# Patient Record
Sex: MALE | Employment: OTHER | URBAN - METROPOLITAN AREA
[De-identification: names, ages, dates, MRNs, and addresses within clinical notes are randomized per-mention and may not be internally consistent; named-entity substitution may affect disease eponyms.]

---

## 2019-08-06 ENCOUNTER — OFFICE VISIT (OUTPATIENT)
Dept: MEDICAL GROUP | Facility: IMAGING CENTER | Age: 59
End: 2019-08-06
Payer: OTHER MISCELLANEOUS

## 2019-08-06 VITALS
HEART RATE: 70 BPM | WEIGHT: 217 LBS | TEMPERATURE: 97.7 F | HEIGHT: 71 IN | RESPIRATION RATE: 12 BRPM | SYSTOLIC BLOOD PRESSURE: 114 MMHG | OXYGEN SATURATION: 94 % | BODY MASS INDEX: 30.38 KG/M2 | DIASTOLIC BLOOD PRESSURE: 78 MMHG

## 2019-08-06 DIAGNOSIS — Z12.12 SCREENING FOR COLORECTAL CANCER: ICD-10-CM

## 2019-08-06 DIAGNOSIS — E66.9 OBESITY (BMI 30-39.9): ICD-10-CM

## 2019-08-06 DIAGNOSIS — Z12.83 SCREENING EXAM FOR SKIN CANCER: ICD-10-CM

## 2019-08-06 DIAGNOSIS — M1A.0710 IDIOPATHIC CHRONIC GOUT OF RIGHT FOOT WITHOUT TOPHUS: ICD-10-CM

## 2019-08-06 DIAGNOSIS — Z12.11 SCREENING FOR COLORECTAL CANCER: ICD-10-CM

## 2019-08-06 PROCEDURE — 99205 OFFICE O/P NEW HI 60 MIN: CPT | Performed by: FAMILY MEDICINE

## 2019-08-06 RX ORDER — FEBUXOSTAT 40 MG/1
40 TABLET, FILM COATED ORAL DAILY
Qty: 21 TAB | Refills: 0 | Status: SHIPPED | OUTPATIENT
Start: 2019-08-06 | End: 2019-10-18 | Stop reason: SDUPTHER

## 2019-08-06 RX ORDER — AMLODIPINE AND VALSARTAN 5; 160 MG/1; MG/1
1 TABLET ORAL DAILY
COMMUNITY
End: 2022-10-21 | Stop reason: SDUPTHER

## 2019-08-06 RX ORDER — ATORVASTATIN CALCIUM 10 MG/1
10 TABLET, FILM COATED ORAL
Refills: 5 | COMMUNITY
Start: 2019-07-18 | End: 2022-02-07

## 2019-08-06 RX ORDER — LOSARTAN POTASSIUM AND HYDROCHLOROTHIAZIDE 12.5; 5 MG/1; MG/1
1 TABLET ORAL
Refills: 5 | COMMUNITY
Start: 2019-07-18 | End: 2019-08-06 | Stop reason: CLARIF

## 2019-08-06 RX ORDER — MULTIVIT WITH MINERALS/LUTEIN
TABLET ORAL
COMMUNITY

## 2019-08-06 ASSESSMENT — PATIENT HEALTH QUESTIONNAIRE - PHQ9: CLINICAL INTERPRETATION OF PHQ2 SCORE: 0

## 2019-08-06 ASSESSMENT — PAIN SCALES - GENERAL: PAINLEVEL: NO PAIN

## 2019-08-06 NOTE — ASSESSMENT & PLAN NOTE
Patient had recently had cardiology visit with Dr. Bloch and has been encouraged to initiate weight loss program.  Discussed with patient of the current dietary habit, patient does have a good basis of vegetarian diet, discussed with patient of several low hanging fruit way to change his lifestyle and improve his BMI while not impacting his goal in lessen gouty flare.

## 2019-08-06 NOTE — ASSESSMENT & PLAN NOTE
He reports the Tristanian decedent history and admits no skin check regularly since his move from [place] to the area.  There has been no significant family hx of skin cancer.  No pleuritic or color change in lesions is currently bothering him.  He does have 2 family members have been earlier colon cancer history and he has been monitoring with a colonoscopy every 5 years.

## 2019-08-06 NOTE — ASSESSMENT & PLAN NOTE
Patient reports the chronic issues with dietary controlled for the most part for the last 5 years.  He had 2 flares that is associated with dietary changes to the more meat and fish as well as alcohol on trip to [place].  Patient's significant other is an immigrant from [facility], they have been attempting mostly vegetarian diet at home.  And reports that has significantly less in the degree of gouty flare.  Over the last 2 months however patient had a significant episode of gouty flare occurring at the right first DIP.  Patient reported have been consistently taking TCM herbs four marvals as well as tart cherry with curcumin which seems to reduce it down but the severity is still at 6/10.  He had trial of colchicine and had skin peelings that he would like not to use it again especially he has been started on a statin.  Currently he has been improved but still would rate it at 1/10 in joint discomfort and swelling.

## 2019-08-06 NOTE — PROGRESS NOTES
Chief Complaint   Patient presents with   • Gout   • Hypertension   • Hyperlipidemia   • Concussion     oct 2018     Gregor Bonilla is a 58 y.o. male who usually sees  presents today to establish care at [facility] and to discuss ***.    HPI:  No problem-specific Assessment & Plan notes found for this encounter.    Depression Screening    Little interest or pleasure in doing things?  0 - not at all  Feeling down, depressed , or hopeless? 0 - not at all  Patient Health Questionnaire Score: 0    If depressive symptoms identified deferred to follow up visit unless specifically addressed in assesment and plan.      Interpretation of PHQ-9 Total Score   Score Severity   1-4 Minimal Depression   5-9 Mild Depression   10-14 Moderate Depression   15-19 Moderately Severe Depression   20-27 Severe Depression       Current medicines (including changes today)  Current Outpatient Medications   Medication Sig Dispense Refill   • atorvastatin (LIPITOR) 10 MG Tab Take 10 mg by mouth every day.  5   • amlodipine-valsartan (EXFORGE) 5-160 MG per tablet Take 1 Tab by mouth every day.     • GLUTAMINE PO Take  by mouth.     • Coenzyme Q10 (COQ10 PO) Take  by mouth.     • Ascorbic Acid (VITAMIN C) 1000 MG Tab Take  by mouth.     • MISC NATURAL PRODUCTS PO Take  by mouth.     • Omega-3 Fatty Acids (FISH OIL PO) Take  by mouth.     • Misc Natural Products (TART CHERRY ADVANCED PO) Take  by mouth.     • Turmeric (CURCUMIN 95 PO) Take  by mouth.     • CITRUS BERGAMOT PO Take  by mouth.     • MISC NATURAL PRODUCTS PO Take  by mouth.     • febuxostat (ULORIC) 40 MG Tab Take 1 Tab by mouth every day. 21 Tab 0     No current facility-administered medications for this visit.      He  has a past medical history of Hyperlipidemia and Hypertension.  He  has no past surgical history on file.  Social History     Tobacco Use   • Smoking status: Never Smoker   • Smokeless tobacco: Never Used   Substance Use Topics   • Alcohol use: Not Currently   • Drug use: Never      History reviewed. No pertinent family history.  No family status information on file.     Social History     Social History Narrative   • Not on file     ***  ROS  Constitutional: Negative for fever, chills, weight loss and malaise/fatigue.   HENT: Negative for ear pain, nosebleeds, congestion, sore throat and neck pain.    Eyes: Negative for blurred vision.   Respiratory: Negative for cough, sputum production, shortness of breath and wheezing.    Cardiovascular: Negative for chest pain, palpitations, orthopnea and leg swelling.   Gastrointestinal: Negative for heartburn, nausea, vomiting and abdominal pain.   Genitourinary: Negative for dysuria, urgency and frequency.   Musculoskeletal: Negative for myalgias, back pain and joint pain.   Skin: Negative for rash and itching.   Neurological: Negative for dizziness, tingling, tremors, sensory change, focal weakness and headaches.   Endo/Heme/Allergies: Does not bruise/bleed easily.   Psychiatric/Behavioral: Negative for depression, anxiety, or memory loss.     All other systems reviewed and are negative except as in HPI.    Labs reviewed with patient:  No results found for: WBC, RBC, HEMOGLOBIN, HEMATOCRIT, MCV, MCH, MCHC, MPV, NEUTSPOLYS, LYMPHOCYTES, MONOCYTES, EOSINOPHILS, BASOPHILS, HYPOCHROMIA, ANISOCYTOSIS   No results found for: SODIUM, POTASSIUM, CHLORIDE, CO2, GLUCOSE, BUN, CREATININE, BUNCREATRAT, GLOMRATE, ALKPHOSPHAT, ASTSGOT, ALTSGPT, TBILIRUBIN, ALB  No results found for: CHOLSTRLTOT  No results found for: LDL  No results found for: HDL  No results found for: TRIGLYCERIDE  No results found for: HBA1C  No results found for: TSHULTRASEN  No results found for: FREET4  No results found for: 25HYDROXY  No components found for: VITB12  No results found for: FOLATE  No components found for: PROST  No results found for: TESTOSTERONE  No results found for: URICACID     Objective:   /78   Pulse 70   Temp 36.5 °C (97.7 °F)   Resp 12   Ht 1.803 m (5'  "11\")   Wt 98.4 kg (217 lb)   SpO2 94%  Body mass index is 30.27 kg/m².  Physical Exam:  Constitutional: Alert, no distress.  Skin: Warm, dry, good turgor, no rashes in visible areas.  Eye: Equal, round and reactive, conjunctiva clear, lids normal.  ENMT: Lips without lesions, good dentition, oropharynx clear.  Neck: Trachea midline, no masses, no thyromegaly.  Respiratory: Unlabored respiratory effort, lungs clear to auscultation, no wheezes, no ronchi.  Cardiovascular: Regular rate and rhythm, no edema.  Abdomen: Soft, non-tender, no masses, no hepatosplenomegaly.  Psych: Alert and oriented x3, normal affect and mood.  ***  Assessment and Plan:   The following treatment plan was discussed  1. Idiopathic chronic gout of right foot without tophus  febuxostat (ULORIC) 40 MG Tab   2. Obesity (BMI 30-39.9)  Patient identified as having weight management issue.  Appropriate orders and counseling given.   3. Screening for colorectal cancer     4. Screening exam for skin cancer  REFERRAL TO DERMATOLOGY       ***check sharing status ***  Records requested.  Followup: Return in about 3 months (around 11/6/2019).    Spent *** minutes in face-to-tace patient contact in which greater than 50% of the visit was spent in counseling and coordination of care including {[facility] TIME:46140}  We also reviewed PMH, family history, and each of his chronic diagnoses in regards to current management, specialty physicians, symptom control, and future planning.  Please refer to assessment and plan/discussion/recommendations for additional details.        I have worked with consultants from the vendor as well as technical experts from [employer] to optimize the interface. I have made every reasonable attempt to correct obvious errors, but I expect that there are errors of grammar and possibly content that I did not discover before finalizing the note.  "

## 2019-08-06 NOTE — LETTER
[facility]  Usman Valdes D.O.  [address] [place] [address]  Fax: [phone]   Authorization for Release/Disclosure of   Protected Health Information   Name: GREGOR FISHMAN : 1960 SSN: [id number]   Address: [address] [place] [address]  [address]  [place] [address] Phone:    There are no phone numbers on file.   I authorize the entity listed below to release/disclose the PHI below to:   [facility]ly [facility]/Usman Valdes D.O. and Usman Valdes D.O.   Provider or Entity Name:     Address   City, State, [facility]   Phone:      Fax:     Reason for request: continuity of care   Information to be released:    [  ] LAST COLONOSCOPY,  including any PATH REPORT and follow-up  [  ] LAST FIT/COLOGUARD RESULT [  ] LAST DEXA  [  ] LAST MAMMOGRAM  [  ] LAST PAP  [  ] LAST LABS [  ] RETINA EXAM REPORT  [  ] IMMUNIZATION RECORDS  [  ] Release all info      [  ] Check here and initial the line next to each item to release ALL health information INCLUDING  _____ Care and treatment for drug and / or alcohol abuse  _____ HIV testing, infection status, or AIDS  _____ Genetic Testing    DATES OF SERVICE OR TIME PERIOD TO BE DISCLOSED: _____________  I understand and acknowledge that:  * This Authorization may be revoked at any time by you in writing, except if your health information has already been used or disclosed.  * Your health information that will be used or disclosed as a result of you signing this authorization could be re-disclosed by the recipient. If this occurs, your re-disclosed health information may no longer be protected by State or Federal laws.  * You may refuse to sign this Authorization. Your refusal will not affect your ability to obtain treatment.  * This Authorization becomes effective upon signing and will  on (date) __________.      If no date is indicated, this Authorization will  one (1) year from the signature date.    Name: Gregor Fishman    Signature:   Date:     2019       PLEASE FAX REQUESTED RECORDS  BACK TO: [phone]

## 2019-08-06 NOTE — PATIENT INSTRUCTIONS
Prickly kaushik bark 425 mg three times daily for 7 days, start uloric after that as the secondary line of treatment for flare.

## 2019-08-08 ENCOUNTER — APPOINTMENT (OUTPATIENT)
Dept: MEDICAL GROUP | Facility: IMAGING CENTER | Age: 59
End: 2019-08-08
Payer: OTHER MISCELLANEOUS

## 2019-08-08 DIAGNOSIS — M1A.0710 IDIOPATHIC CHRONIC GOUT OF RIGHT FOOT WITHOUT TOPHUS: ICD-10-CM

## 2019-08-08 DIAGNOSIS — E66.9 OBESITY (BMI 30-39.9): ICD-10-CM

## 2019-08-09 ENCOUNTER — TELEPHONE (OUTPATIENT)
Dept: MEDICAL GROUP | Facility: IMAGING CENTER | Age: 59
End: 2019-08-09

## 2019-08-09 NOTE — TELEPHONE ENCOUNTER
Left msg for pt that Dr. Valdes ordered some additional lab work. Would like to know if pt is going to  lab orders or if he'd like me to mail them to his home address.

## 2019-12-20 LAB
CRP SERPL-MCNC: <1 MG/L (ref 0–10)
ERYTHROCYTE [SEDIMENTATION RATE] IN BLOOD BY WESTERGREN METHOD: 2 MM/HR (ref 0–30)
URATE SERPL-MCNC: 6.5 MG/DL (ref 3.7–8.6)

## 2020-09-14 RX ORDER — AMLODIPINE AND VALSARTAN 5; 160 MG/1; MG/1
TABLET ORAL
Qty: 30 TAB | Refills: 11 | OUTPATIENT
Start: 2020-09-14

## 2020-10-24 ENCOUNTER — IMMUNIZATION (OUTPATIENT)
Dept: SOCIAL WORK | Facility: CLINIC | Age: 60
End: 2020-10-24
Payer: COMMERCIAL

## 2020-10-24 DIAGNOSIS — Z23 NEED FOR VACCINATION: Primary | ICD-10-CM

## 2020-10-24 PROCEDURE — 90471 IMMUNIZATION ADMIN: CPT | Performed by: FAMILY MEDICINE

## 2020-10-24 PROCEDURE — 90686 IIV4 VACC NO PRSV 0.5 ML IM: CPT | Performed by: FAMILY MEDICINE

## 2021-03-15 DIAGNOSIS — Z23 NEED FOR VACCINATION: ICD-10-CM

## 2022-02-07 ENCOUNTER — TELEPHONE (OUTPATIENT)
Dept: VASCULAR LAB | Facility: MEDICAL CENTER | Age: 62
End: 2022-02-07

## 2022-02-07 DIAGNOSIS — E78.5 DYSLIPIDEMIA: ICD-10-CM

## 2022-02-07 RX ORDER — ATORVASTATIN CALCIUM 40 MG/1
40 TABLET, FILM COATED ORAL NIGHTLY
Qty: 90 TABLET | Refills: 0 | Status: SHIPPED | OUTPATIENT
Start: 2022-02-07 | End: 2022-04-27

## 2022-02-07 NOTE — TELEPHONE ENCOUNTER
Pt is requesting a refill of atorvastatin 40mg tablets. Only 10mg listed on his med list. Pt has upcoming appt with Dr. Bloch, [facility] transfer. Please advise.

## 2022-05-03 SDOH — ECONOMIC STABILITY: TRANSPORTATION INSECURITY
IN THE PAST 12 MONTHS, HAS THE LACK OF TRANSPORTATION KEPT YOU FROM MEDICAL APPOINTMENTS OR FROM GETTING MEDICATIONS?: NO

## 2022-05-03 SDOH — HEALTH STABILITY: PHYSICAL HEALTH: ON AVERAGE, HOW MANY DAYS PER WEEK DO YOU ENGAGE IN MODERATE TO STRENUOUS EXERCISE (LIKE A BRISK WALK)?: 6 DAYS

## 2022-05-03 SDOH — ECONOMIC STABILITY: FOOD INSECURITY: WITHIN THE PAST 12 MONTHS, YOU WORRIED THAT YOUR FOOD WOULD RUN OUT BEFORE YOU GOT MONEY TO BUY MORE.: NEVER TRUE

## 2022-05-03 SDOH — ECONOMIC STABILITY: HOUSING INSECURITY
IN THE LAST 12 MONTHS, WAS THERE A TIME WHEN YOU DID NOT HAVE A STEADY PLACE TO SLEEP OR SLEPT IN A SHELTER (INCLUDING NOW)?: NO

## 2022-05-03 SDOH — ECONOMIC STABILITY: HOUSING INSECURITY: IN THE LAST 12 MONTHS, HOW MANY PLACES HAVE YOU LIVED?: 1

## 2022-05-03 SDOH — ECONOMIC STABILITY: TRANSPORTATION INSECURITY
IN THE PAST 12 MONTHS, HAS LACK OF RELIABLE TRANSPORTATION KEPT YOU FROM MEDICAL APPOINTMENTS, MEETINGS, WORK OR FROM GETTING THINGS NEEDED FOR DAILY LIVING?: NO

## 2022-05-03 SDOH — ECONOMIC STABILITY: INCOME INSECURITY: IN THE LAST 12 MONTHS, WAS THERE A TIME WHEN YOU WERE NOT ABLE TO PAY THE MORTGAGE OR RENT ON TIME?: NO

## 2022-05-03 SDOH — ECONOMIC STABILITY: TRANSPORTATION INSECURITY
IN THE PAST 12 MONTHS, HAS LACK OF TRANSPORTATION KEPT YOU FROM MEETINGS, WORK, OR FROM GETTING THINGS NEEDED FOR DAILY LIVING?: NO

## 2022-05-03 SDOH — HEALTH STABILITY: MENTAL HEALTH
STRESS IS WHEN SOMEONE FEELS TENSE, NERVOUS, ANXIOUS, OR CAN'T SLEEP AT NIGHT BECAUSE THEIR MIND IS TROUBLED. HOW STRESSED ARE YOU?: ONLY A LITTLE

## 2022-05-03 SDOH — ECONOMIC STABILITY: FOOD INSECURITY: WITHIN THE PAST 12 MONTHS, THE FOOD YOU BOUGHT JUST DIDN'T LAST AND YOU DIDN'T HAVE MONEY TO GET MORE.: NEVER TRUE

## 2022-05-03 SDOH — HEALTH STABILITY: PHYSICAL HEALTH: ON AVERAGE, HOW MANY MINUTES DO YOU ENGAGE IN EXERCISE AT THIS LEVEL?: 30 MIN

## 2022-05-03 SDOH — ECONOMIC STABILITY: INCOME INSECURITY: HOW HARD IS IT FOR YOU TO PAY FOR THE VERY BASICS LIKE FOOD, HOUSING, MEDICAL CARE, AND HEATING?: NOT HARD AT ALL

## 2022-05-03 ASSESSMENT — SOCIAL DETERMINANTS OF HEALTH (SDOH)
DO YOU BELONG TO ANY CLUBS OR ORGANIZATIONS SUCH AS CHURCH GROUPS UNIONS, FRATERNAL OR ATHLETIC GROUPS, OR SCHOOL GROUPS?: YES
HOW OFTEN DO YOU ATTEND CHURCH OR RELIGIOUS SERVICES?: NEVER
HOW OFTEN DO YOU HAVE A DRINK CONTAINING ALCOHOL: MONTHLY OR LESS
HOW OFTEN DO YOU ATTENT MEETINGS OF THE CLUB OR ORGANIZATION YOU BELONG TO?: PATIENT DECLINED
HOW OFTEN DO YOU HAVE SIX OR MORE DRINKS ON ONE OCCASION: NEVER
HOW OFTEN DO YOU ATTENT MEETINGS OF THE CLUB OR ORGANIZATION YOU BELONG TO?: PATIENT DECLINED
HOW OFTEN DO YOU GET TOGETHER WITH FRIENDS OR RELATIVES?: MORE THAN THREE TIMES A WEEK
WITHIN THE PAST 12 MONTHS, YOU WORRIED THAT YOUR FOOD WOULD RUN OUT BEFORE YOU GOT THE MONEY TO BUY MORE: NEVER TRUE
IN A TYPICAL WEEK, HOW MANY TIMES DO YOU TALK ON THE PHONE WITH FAMILY, FRIENDS, OR NEIGHBORS?: MORE THAN THREE TIMES A WEEK
HOW OFTEN DO YOU ATTEND CHURCH OR RELIGIOUS SERVICES?: NEVER
DO YOU BELONG TO ANY CLUBS OR ORGANIZATIONS SUCH AS CHURCH GROUPS UNIONS, FRATERNAL OR ATHLETIC GROUPS, OR SCHOOL GROUPS?: YES
IN A TYPICAL WEEK, HOW MANY TIMES DO YOU TALK ON THE PHONE WITH FAMILY, FRIENDS, OR NEIGHBORS?: MORE THAN THREE TIMES A WEEK
HOW HARD IS IT FOR YOU TO PAY FOR THE VERY BASICS LIKE FOOD, HOUSING, MEDICAL CARE, AND HEATING?: NOT HARD AT ALL
HOW OFTEN DO YOU GET TOGETHER WITH FRIENDS OR RELATIVES?: MORE THAN THREE TIMES A WEEK
HOW MANY DRINKS CONTAINING ALCOHOL DO YOU HAVE ON A TYPICAL DAY WHEN YOU ARE DRINKING: 1 OR 2

## 2022-05-03 ASSESSMENT — LIFESTYLE VARIABLES
HOW OFTEN DO YOU HAVE A DRINK CONTAINING ALCOHOL: MONTHLY OR LESS
HOW MANY STANDARD DRINKS CONTAINING ALCOHOL DO YOU HAVE ON A TYPICAL DAY: 1 OR 2
HOW OFTEN DO YOU HAVE SIX OR MORE DRINKS ON ONE OCCASION: NEVER

## 2022-05-06 ENCOUNTER — OFFICE VISIT (OUTPATIENT)
Dept: MEDICAL GROUP | Facility: LAB | Age: 62
End: 2022-05-06
Payer: COMMERCIAL

## 2022-05-06 VITALS
BODY MASS INDEX: 30.77 KG/M2 | HEART RATE: 72 BPM | SYSTOLIC BLOOD PRESSURE: 126 MMHG | TEMPERATURE: 97.3 F | RESPIRATION RATE: 14 BRPM | OXYGEN SATURATION: 96 % | WEIGHT: 219.8 LBS | DIASTOLIC BLOOD PRESSURE: 70 MMHG | HEIGHT: 71 IN

## 2022-05-06 DIAGNOSIS — E78.5 HYPERLIPIDEMIA, UNSPECIFIED HYPERLIPIDEMIA TYPE: ICD-10-CM

## 2022-05-06 DIAGNOSIS — E78.5 DYSLIPIDEMIA: ICD-10-CM

## 2022-05-06 DIAGNOSIS — Z12.5 ENCOUNTER FOR SCREENING FOR MALIGNANT NEOPLASM OF PROSTATE: ICD-10-CM

## 2022-05-06 DIAGNOSIS — Z76.89 ENCOUNTER TO ESTABLISH CARE: ICD-10-CM

## 2022-05-06 DIAGNOSIS — Z23 NEED FOR VACCINATION: ICD-10-CM

## 2022-05-06 DIAGNOSIS — Z11.59 NEED FOR HEPATITIS C SCREENING TEST: ICD-10-CM

## 2022-05-06 DIAGNOSIS — I10 PRIMARY HYPERTENSION: ICD-10-CM

## 2022-05-06 DIAGNOSIS — R68.82 LOW LIBIDO: ICD-10-CM

## 2022-05-06 DIAGNOSIS — M1A.0710 IDIOPATHIC CHRONIC GOUT OF RIGHT FOOT WITHOUT TOPHUS: ICD-10-CM

## 2022-05-06 PROBLEM — G47.00 INSOMNIA, UNSPECIFIED: Status: ACTIVE | Noted: 2021-08-17

## 2022-05-06 PROBLEM — Z12.83 SCREENING EXAM FOR SKIN CANCER: Status: RESOLVED | Noted: 2019-08-06 | Resolved: 2022-05-06

## 2022-05-06 PROBLEM — Q24.5: Status: ACTIVE | Noted: 2020-01-21

## 2022-05-06 PROCEDURE — 99204 OFFICE O/P NEW MOD 45 MIN: CPT | Mod: 25 | Performed by: FAMILY MEDICINE

## 2022-05-06 PROCEDURE — 90715 TDAP VACCINE 7 YRS/> IM: CPT | Performed by: FAMILY MEDICINE

## 2022-05-06 PROCEDURE — 90471 IMMUNIZATION ADMIN: CPT | Performed by: FAMILY MEDICINE

## 2022-05-06 RX ORDER — ATORVASTATIN CALCIUM 40 MG/1
40 TABLET, FILM COATED ORAL EVERY EVENING
Qty: 90 TABLET | Refills: 3 | Status: SHIPPED | OUTPATIENT
Start: 2022-05-06 | End: 2022-08-04

## 2022-05-06 ASSESSMENT — ENCOUNTER SYMPTOMS
DIARRHEA: 0
CONSTIPATION: 0
WHEEZING: 0
PALPITATIONS: 0
ABDOMINAL PAIN: 0
NERVOUS/ANXIOUS: 0
BLURRED VISION: 0
FEVER: 0
VOMITING: 0
CHILLS: 0
SHORTNESS OF BREATH: 0
NAUSEA: 0
DEPRESSION: 0

## 2022-05-06 ASSESSMENT — PATIENT HEALTH QUESTIONNAIRE - PHQ9: CLINICAL INTERPRETATION OF PHQ2 SCORE: 0

## 2022-05-06 NOTE — PROGRESS NOTES
Gregor Bonilla is a 61 y.o. male here for No chief complaint on file.      HPI:  Gregor is a very pleasant 61 y.o. male.     #Establish care   -Reviewed all past medical history, family history, social history.  -Reviewed all screening/vaccinations: Due for Tdap, zoster vaccine.  Also due for labs including hepatitis C screening, PSA, lipid profile.  -Diet and Exercise: Regular exercise, mostly plant-based diet.  -Tobacco, alcohol, recreational drug use: Very rare alcohol use.  Denies any tobacco, recreational illicit drug use.  -Sexually active: Monogamous with female partner.    #Hyperlipidemia   -Currently treated Lipitor 40 mg daily.  Is compliant with medication, denies any side effects.  Work on appropriate diet and exercise regimen.    #Hypertension:  -Currently on amlodipine-valsartan.  Denies any side effects, no concerns.    #Gout:  -Patient states at 1 point he was diagnosed with gout after becoming very symptomatic.  At the time he was placed on Uloric; however, he did not start the medication, instead he began a mostly plant-based diet, working on good hydration, avoidance of alcohol.  He has noticed significant provement has not had any flareups since then.  He is currently not on Uloric.    #Low libido:  -Patient is notes over the last several months decrease in libido.  Is also noticed that decrease in the duration of a reaction.  Vision no problems.  Denies any increased fatigue, depression at this time.    Current medicines (including changes today)  Current Outpatient Medications   Medication Sig Dispense Refill   • atorvastatin (LIPITOR) 40 MG Tab TAKE 1 TABLET BY MOUTH EVERY DAY IN THE EVENING 30 Tablet 6   • febuxostat (ULORIC) 40 MG Tab TAKE 1 TABLET BY MOUTH EVERY DAY 21 Tab 0   • amlodipine-valsartan (EXFORGE) 5-160 MG per tablet Take 1 Tab by mouth every day.     • GLUTAMINE PO Take  by mouth.     • Coenzyme Q10 (COQ10 PO) Take  by mouth.     • Ascorbic Acid (VITAMIN C) 1000 MG Tab Take  by mouth.  "    • MISC NATURAL PRODUCTS PO Take  by mouth.     • Omega-3 Fatty Acids (FISH OIL PO) Take  by mouth.     • Misc Natural Products (TART CHERRY ADVANCED PO) Take  by mouth.     • Turmeric (CURCUMIN 95 PO) Take  by mouth.     • CITRUS BERGAMOT PO Take  by mouth.     • MISC NATURAL PRODUCTS PO Take  by mouth.       No current facility-administered medications for this visit.     He  has a past medical history of Hyperlipidemia and Hypertension.  He  has no past surgical history on file.  Social History     Tobacco Use   • Smoking status: Never Smoker   • Smokeless tobacco: Never Used   Vaping Use   • Vaping Use: Never used   Substance Use Topics   • Alcohol use: Not Currently   • Drug use: Never     Social History     Social History Narrative   • Not on file     Family History   Problem Relation Age of Onset   • Cancer Mother    • Heart Attack Father      Family Status   Relation Name Status   • Mo     • Fa         ROS  Review of Systems   Constitutional: Negative for chills and fever.   HENT: Negative for hearing loss.    Eyes: Negative for blurred vision.   Respiratory: Negative for shortness of breath and wheezing.    Cardiovascular: Negative for chest pain and palpitations.   Gastrointestinal: Negative for abdominal pain, constipation, diarrhea, nausea and vomiting.   Psychiatric/Behavioral: Negative for depression. The patient is not nervous/anxious.         Objective:     /70 (BP Location: Left arm, Patient Position: Sitting)   Pulse 72   Temp 36.3 °C (97.3 °F)   Resp 14   Ht 1.803 m (5' 11\")   Wt 99.7 kg (219 lb 12.8 oz)   SpO2 96%  Body mass index is 30.66 kg/m².  Physical Exam:    Constitutional: Alert, no distress.  Skin: Warm, dry, good turgor, no rashes in visible areas.  Eye: Equal, round and reactive, conjunctiva clear, lids normal.  Neck: Trachea midline, no masses, no thyromegaly. No cervical or supraclavicular lymphadenopathy.  Respiratory: Unlabored respiratory effort, " lungs clear to auscultation bilaterally, no wheezes, rales, or ronchi.  Cardiovascular: Normal S1, S2, RRR, no murmur, no edema.  Abdomen: Soft, non-tender, no masses, no hepatosplenomegaly.  Psych: Alert and oriented x3, normal affect and mood.      Assessment and Plan:   The following treatment plan was discussed    1. Encounter to establish care  -All questions concerns were answered at this time.  -Vaccinations/screenings needed at this time: Complete Tdap today.  Will complete labs as below.  -Labs reviewed, no concerns, check labs as below.  -Discussed the importance of a healthy, Mediterranean style diet, routine exercise regimen.  -Discussed general safety measures including seatbelts, helmets, avoidance of smoking, sunscreen, hydration.  -Follow-up for general physical exam on a yearly basis, sooner if needed.  - CBC WITHOUT DIFFERENTIAL; Future  - Comp Metabolic Panel; Future    2. Dyslipidemia  -Stable.  No concerns at this time.  We will recheck lipid profile, titrate medication as needed.  - atorvastatin (LIPITOR) 40 MG Tab; Take 1 Tablet by mouth every evening for 90 days.  Dispense: 90 Tablet; Refill: 3    3. Primary hypertension  -Stable.  Blood pressure is at goal.  We will continue amlodipine-valsartan.  Follow-up as needed.    4. Idiopathic chronic gout of right foot without tophus  -Continue with plant-based diet, good hydration, avoidance of alcohol.  We will recheck uric acid normal levels.  - URIC ACID; Future    5. Low libido  -New problem.  Further evaluation is needed.  We will check testosterone levels at this time.  - Testosterone, Free & Total, Adult Male (w/SHBG); Future    6. Hyperlipidemia, unspecified hyperlipidemia type  -See #2.  - Lipid Profile; Future    7. Need for hepatitis C screening test  - HEP C VIRUS ANTIBODY; Future    8. Encounter for screening for malignant neoplasm of prostate  - PROSTATE SPECIFIC AG SCREENING; Future    9. Need for vaccination  -Patient was agreeable  to receiving the tdap vaccine in clinic today after discussion of potential risks, benefits, and side effects. Vaccine was administered without adverse effects.  - Tdap Vaccine =>6YO IM      Records requested.  Followup: No follow-ups on file.         This note was created using voice recognition software. I have made every reasonable attempt to correct errors, however, I do anticipate some grammatical errors.

## 2022-05-09 ENCOUNTER — HOSPITAL ENCOUNTER (OUTPATIENT)
Dept: LAB | Facility: MEDICAL CENTER | Age: 62
End: 2022-05-09
Attending: FAMILY MEDICINE
Payer: COMMERCIAL

## 2022-05-09 DIAGNOSIS — E78.5 HYPERLIPIDEMIA, UNSPECIFIED HYPERLIPIDEMIA TYPE: ICD-10-CM

## 2022-05-09 DIAGNOSIS — Z11.59 NEED FOR HEPATITIS C SCREENING TEST: ICD-10-CM

## 2022-05-09 DIAGNOSIS — M1A.0710 IDIOPATHIC CHRONIC GOUT OF RIGHT FOOT WITHOUT TOPHUS: ICD-10-CM

## 2022-05-09 DIAGNOSIS — Z76.89 ENCOUNTER TO ESTABLISH CARE: ICD-10-CM

## 2022-05-09 DIAGNOSIS — R68.82 LOW LIBIDO: ICD-10-CM

## 2022-05-09 DIAGNOSIS — Z12.5 ENCOUNTER FOR SCREENING FOR MALIGNANT NEOPLASM OF PROSTATE: ICD-10-CM

## 2022-05-09 LAB
ALBUMIN SERPL BCP-MCNC: 4.7 G/DL (ref 3.2–4.9)
ALBUMIN/GLOB SERPL: 2 G/DL
ALP SERPL-CCNC: 85 U/L (ref 30–99)
ALT SERPL-CCNC: 45 U/L (ref 2–50)
ANION GAP SERPL CALC-SCNC: 9 MMOL/L (ref 7–16)
AST SERPL-CCNC: 23 U/L (ref 12–45)
BILIRUB SERPL-MCNC: 0.5 MG/DL (ref 0.1–1.5)
BUN SERPL-MCNC: 12 MG/DL (ref 8–22)
CALCIUM SERPL-MCNC: 9.2 MG/DL (ref 8.5–10.5)
CHLORIDE SERPL-SCNC: 107 MMOL/L (ref 96–112)
CHOLEST SERPL-MCNC: 132 MG/DL (ref 100–199)
CO2 SERPL-SCNC: 26 MMOL/L (ref 20–33)
CREAT SERPL-MCNC: 0.8 MG/DL (ref 0.5–1.4)
ERYTHROCYTE [DISTWIDTH] IN BLOOD BY AUTOMATED COUNT: 41.9 FL (ref 35.9–50)
FASTING STATUS PATIENT QL REPORTED: NORMAL
GFR SERPLBLD CREATININE-BSD FMLA CKD-EPI: 101 ML/MIN/1.73 M 2
GLOBULIN SER CALC-MCNC: 2.3 G/DL (ref 1.9–3.5)
GLUCOSE SERPL-MCNC: 107 MG/DL (ref 65–99)
HCT VFR BLD AUTO: 48.1 % (ref 42–52)
HCV AB SER QL: NORMAL
HDLC SERPL-MCNC: 42 MG/DL
HGB BLD-MCNC: 16.7 G/DL (ref 14–18)
LDLC SERPL CALC-MCNC: 73 MG/DL
MCH RBC QN AUTO: 30.8 PG (ref 27–33)
MCHC RBC AUTO-ENTMCNC: 34.7 G/DL (ref 33.7–35.3)
MCV RBC AUTO: 88.6 FL (ref 81.4–97.8)
PLATELET # BLD AUTO: 148 K/UL (ref 164–446)
PMV BLD AUTO: 12.7 FL (ref 9–12.9)
POTASSIUM SERPL-SCNC: 3.8 MMOL/L (ref 3.6–5.5)
PROT SERPL-MCNC: 7 G/DL (ref 6–8.2)
PSA SERPL-MCNC: 1.46 NG/ML (ref 0–4)
RBC # BLD AUTO: 5.43 M/UL (ref 4.7–6.1)
SODIUM SERPL-SCNC: 142 MMOL/L (ref 135–145)
TRIGL SERPL-MCNC: 86 MG/DL (ref 0–149)
URATE SERPL-MCNC: 7.3 MG/DL (ref 2.5–8.3)
WBC # BLD AUTO: 6.7 K/UL (ref 4.8–10.8)

## 2022-05-09 PROCEDURE — 84403 ASSAY OF TOTAL TESTOSTERONE: CPT

## 2022-05-09 PROCEDURE — 80061 LIPID PANEL: CPT

## 2022-05-09 PROCEDURE — 80053 COMPREHEN METABOLIC PANEL: CPT

## 2022-05-09 PROCEDURE — 84402 ASSAY OF FREE TESTOSTERONE: CPT

## 2022-05-09 PROCEDURE — 85027 COMPLETE CBC AUTOMATED: CPT

## 2022-05-09 PROCEDURE — 84270 ASSAY OF SEX HORMONE GLOBUL: CPT

## 2022-05-09 PROCEDURE — 84153 ASSAY OF PSA TOTAL: CPT

## 2022-05-09 PROCEDURE — 36415 COLL VENOUS BLD VENIPUNCTURE: CPT

## 2022-05-09 PROCEDURE — 84550 ASSAY OF BLOOD/URIC ACID: CPT

## 2022-05-09 PROCEDURE — 86803 HEPATITIS C AB TEST: CPT

## 2022-05-11 LAB
SHBG SERPL-SCNC: 54 NMOL/L (ref 19–76)
TESTOST FREE MFR SERPL: 1.4 % (ref 1.6–2.9)
TESTOST FREE SERPL-MCNC: 69 PG/ML (ref 47–244)
TESTOST SERPL-MCNC: 493 NG/DL (ref 300–720)

## 2022-08-22 ENCOUNTER — TELEMEDICINE (OUTPATIENT)
Dept: MEDICAL GROUP | Facility: LAB | Age: 62
End: 2022-08-22
Payer: COMMERCIAL

## 2022-08-22 VITALS — BODY MASS INDEX: 30.66 KG/M2 | HEIGHT: 71 IN | WEIGHT: 219 LBS

## 2022-08-22 DIAGNOSIS — U07.1 COVID-19: Primary | ICD-10-CM

## 2022-08-22 PROCEDURE — 99213 OFFICE O/P EST LOW 20 MIN: CPT | Mod: 95 | Performed by: PHYSICIAN ASSISTANT

## 2022-08-22 RX ORDER — ATORVASTATIN CALCIUM 40 MG/1
TABLET, FILM COATED ORAL
COMMUNITY
End: 2023-05-22

## 2022-08-22 ASSESSMENT — FIBROSIS 4 INDEX: FIB4 SCORE: 1.39

## 2022-08-22 NOTE — PROGRESS NOTES
This evaluation was conducted via Zoom using secure and encrypted videoconferencing technology. The patient was in their home in the [facility].    The patient's identity was confirmed and verbal consent was obtained for this virtual visit.    Subjective:     CC: COVID 19  Gregor Bonilla is a 61 y.o. male presenting to discuss the evaluation and management of covid 19 symptoms     COVID-19  New to me; symptoms x1 week, tested positive on Saturday.   Generally, feeling better. Still feels weak.   URI symptoms all seem to be improving.     ROS  See HPI  Constitutional: Negative for fever, chills and malaise/fatigue.   Respiratory: Negative for cough and shortness of breath.    Cardiovascular: Negative for leg swelling.   Skin: Negative for rash.   Psychiatric/Behavioral: Negative for depression.  The patient is not nervous/anxious.      Allergies   Allergen Reactions    Pcn [Penicillins]      Reaction unknown       Current medicines (including changes today)  Current Outpatient Medications   Medication Sig Dispense Refill    atorvastatin (LIPITOR) 40 MG Tab       amlodipine-valsartan (EXFORGE) 5-160 MG per tablet Take 1 Tab by mouth every day.      GLUTAMINE PO Take  by mouth.      Coenzyme Q10 (COQ10 PO) Take  by mouth.      Ascorbic Acid (VITAMIN C) 1000 MG Tab Take  by mouth.      MISC NATURAL PRODUCTS PO Take  by mouth.      Omega-3 Fatty Acids (FISH OIL PO) Take  by mouth.      Misc Natural Products (TART CHERRY ADVANCED PO) Take  by mouth.      Turmeric (CURCUMIN 95 PO) Take  by mouth.       No current facility-administered medications for this visit.       He  has a past medical history of Hyperlipidemia and Hypertension.  He  has a past surgical history that includes cervical fusion posterior and lipoma excision.      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  Skin: No rashes in visible areas.  Eye: Round. Conjunctiva clear, lids normal. No icterus.   ENMT: Lips pink without lesions, good dentition, moist mucous membranes. Phonation normal.  Neck: No masses, no thyromegaly. Moves freely without pain.  Respiratory: Unlabored respiratory effort, no cough or audible wheeze  Psych: Alert and oriented x3, normal affect and mood.       Assessment and Plan:   The following treatment plan was discussed:     1. COVID-19  Pt tested positive for COVID19 08/20/2022.   Symptoms started about 1 week ago.   Generally feeling much better.   No Paxlovid at this time.   Continue to monitor symptoms, rest, maintain good hydration.   F/u prn.     Other orders  - atorvastatin (LIPITOR) 40 MG Tab        Follow-up: Return if symptoms worsen or fail to improve.    Carolyne Portillo P.A.-C.  Supervising MD: Dr. Marcio Pacheco MD  08/22/22  "

## 2022-08-22 NOTE — ASSESSMENT & PLAN NOTE
New to me; symptoms x1 week, tested positive on Saturday.   Generally, feeling better. Still feels weak.   URI symptoms all seem to be improving.    Attending Attestation (For Attendings USE Only)...

## 2022-10-21 RX ORDER — AMLODIPINE AND VALSARTAN 5; 160 MG/1; MG/1
1 TABLET ORAL DAILY
Qty: 90 TABLET | Refills: 2 | Status: SHIPPED | OUTPATIENT
Start: 2022-10-21 | End: 2023-07-17

## 2022-10-21 NOTE — TELEPHONE ENCOUNTER
Received request via: Patient  Send MC once approved.     Was the patient seen in the last year in this department? Yes  8/22/22  Does the patient have an active prescription (recently filled or refills available) for medication(s) requested? No

## 2022-10-26 NOTE — PROGRESS NOTES
Chief Complaint   Patient presents with   • Gout   • Hypertension   • Hyperlipidemia   • Concussion     oct 2018     Gregor Bonilla is a 58 y.o. male who usually sees [place] area PCP presents today to establish care at [facility] and to discuss gouty attack.    HPI:  Idiopathic chronic gout of right foot without tophus  Patient reports the chronic issues with dietary controlled for the most part for the last 5 years.  He had 2 flares that is associated with dietary changes to the more meat and fish as well as alcohol on trip to [facility].  Patient's significant other is an immigrant from [facility], they have been attempting mostly vegetarian diet at home.  And reports that has significantly less in the degree of gouty flare.  Over the last 2 months however patient had a significant episode of gouty flare occurring at the right first DIP.  Patient reported have been consistently taking TCM herbs four marvals as well as tart cherry with curcumin which seems to reduce it down but the severity is still at 6/10.  He had trial of colchicine and had skin peelings that he would like not to use it again especially he has been started on a statin.  Currently he has been improved but still would rate it at 1/10 in joint discomfort and swelling.    Screening exam for skin cancer  He reports the Arabic decedent history and admits no skin check regularly since his move from [place] to the [facility].  There has been no significant family hx of skin cancer.  No pleuritic or color change in lesions is currently bothering him.  He does have 2 family members have been earlier colon cancer history and he has been monitoring with a colonoscopy every 5 years.    Obesity (BMI 30-39.9)  Patient had recently had cardiology visit with Dr. Bloch and has been encouraged to initiate weight loss program.  Discussed with patient of the current dietary habit, patient does have a good basis of vegetarian diet, discussed with patient of several low hanging fruit way to change his  lifestyle and improve his BMI while not impacting his goal in lessen gouty flare.    Depression Screening    Little interest or pleasure in doing things?  0 - not at all  Feeling down, depressed , or hopeless? 0 - not at all  Patient Health Questionnaire Score: 0    If depressive symptoms identified deferred to follow up visit unless specifically addressed in assesment and plan.      Interpretation of PHQ-9 Total Score   Score Severity   1-4 Minimal Depression   5-9 Mild Depression   10-14 Moderate Depression   15-19 Moderately Severe Depression   20-27 Severe Depression       Current medicines (including changes today)  Current Outpatient Medications   Medication Sig Dispense Refill   • atorvastatin (LIPITOR) 10 MG Tab Take 10 mg by mouth every day.  5   • amlodipine-valsartan (EXFORGE) 5-160 MG per tablet Take 1 Tab by mouth every day.     • GLUTAMINE PO Take  by mouth.     • Coenzyme Q10 (COQ10 PO) Take  by mouth.     • Ascorbic Acid (VITAMIN C) 1000 MG Tab Take  by mouth.     • MISC NATURAL PRODUCTS PO Take  by mouth.     • Omega-3 Fatty Acids (FISH OIL PO) Take  by mouth.     • Misc Natural Products (TART CHERRY ADVANCED PO) Take  by mouth.     • Turmeric (CURCUMIN 95 PO) Take  by mouth.     • CITRUS BERGAMOT PO Take  by mouth.     • MISC NATURAL PRODUCTS PO Take  by mouth.     • febuxostat (ULORIC) 40 MG Tab Take 1 Tab by mouth every day. 21 Tab 0     No current facility-administered medications for this visit.      He  has a past medical history of Hyperlipidemia and Hypertension.  He  has no past surgical history on file.  Social History     Tobacco Use   • Smoking status: Never Smoker   • Smokeless tobacco: Never Used   Substance Use Topics   • Alcohol use: Not Currently   • Drug use: Never     History reviewed. No pertinent family history.  No family status information on file.     Social History     Social History Narrative   • Not on file     ROS  Constitutional: Negative for fever, chills, weight loss  "and malaise/fatigue.   HENT: Negative for ear pain, nosebleeds, congestion, sore throat and neck pain.    Eyes: Negative for blurred vision.   Respiratory: Negative for cough, sputum production, shortness of breath and wheezing.    Cardiovascular: Negative for chest pain, palpitations, orthopnea and leg swelling.   Gastrointestinal: Negative for heartburn, nausea, vomiting and abdominal pain.   Genitourinary: Negative for dysuria, urgency and frequency.   Musculoskeletal: Negative for myalgias, back pain and joint pain.   Skin: Negative for rash and itching.   Neurological: Negative for dizziness, tingling, tremors, sensory change, focal weakness and headaches.   Endo/Heme/Allergies: Does not bruise/bleed easily.   Psychiatric/Behavioral: Negative for depression, anxiety, or memory loss.     All other systems reviewed and are negative except as in HPI.    Labs reviewed with patient:  Reviewed with patient of the labs performed Feb 2019.  Borderline Hb A1c.     Objective:   /78   Pulse 70   Temp 36.5 °C (97.7 °F)   Resp 12   Ht 1.803 m (5' 11\")   Wt 98.4 kg (217 lb)   SpO2 94%  Body mass index is 30.27 kg/m².  Physical Exam:  Constitutional: Alert, no distress.  Skin: Warm, dry, good turgor, no rashes in visible areas.  Eye: Equal, round and reactive, conjunctiva clear, lids normal.  ENMT: Lips without lesions, good dentition, oropharynx clear.  Neck: Trachea midline, no masses, no thyromegaly.  Respiratory: Unlabored respiratory effort, lungs clear to auscultation, no wheezes, no ronchi.  Cardiovascular: Regular rate and rhythm, no edema.  Abdomen: Soft, non-tender, no masses, no hepatosplenomegaly.  Psych: Alert and oriented x3, normal affect and mood.    Assessment and Plan:   The following treatment plan was discussed  1. 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  Since patient still has 10% residual symptoms of the prior lengthy gouty flare episode.  Patient is advised to use vitamin C 500 mg twice a day as well as prickly kaushik bark 425 mg 3 times a day for the next 7 days.   2. Obesity (BMI 30-39.9)  Patient identified as having weight management issue.  Appropriate orders and counseling given.    He has been discussed of the possible insulin resistance, since Dr. Bloch is perfoming Lipofit by NMR in 2 months that we will obtain the record of orders place the additional lab monitoring his metabolic status.  In the meantime patient can replace coenzyme Q 10 with alpha lipoic acid 1000 mg daily for 3 weeks interval.   3. Screening for colorectal cancer      Discussed with patient with his post concussion syndrome and will encourge patient to schedule his colonascopy with his gatrointestinal specialist in [place] given he makes his trip to [place] quite frequently.   4. Screening exam for skin cancer  REFERRAL TO DERMATOLOGY    Discussed the  lineage and the risk and benefit of annual skin exam.  Patient verbalized understanding and is agreeable to referral.      Records requested.  Followup: Return in about 3 months (around 11/6/2019).    Spent 60 minutes in face-to-tace patient contact in which greater than 50% of the visit was spent in counseling and coordination of care including Gouty flare management options, Answering patient questions about obesity, Concerns and potential risks related to proinflammatary diet, Discussing in depth the importance of primary prevention of colon cancer, Discussing the nature of obesity and Patient is also educated about lifestyle modifications which may improve obesity, chronic gouty flare, increased systemic insulin resistance.  We also reviewed PMH, family history, and each of his chronic diagnoses in regards to current management, specialty physicians, symptom control, and  future planning.  Please refer to assessment and plan/discussion/recommendations for additional details.        I have worked with consultants from the vendor as well as technical experts from [facility] to optimize the interface. I have made every reasonable attempt to correct obvious errors, but I expect that there are errors of grammar and possibly content that I did not discover before finalizing the note.   medicine team 6

## 2023-01-11 ENCOUNTER — OFFICE VISIT (OUTPATIENT)
Dept: MEDICAL GROUP | Facility: LAB | Age: 63
End: 2023-01-11
Payer: COMMERCIAL

## 2023-01-11 VITALS
SYSTOLIC BLOOD PRESSURE: 110 MMHG | HEIGHT: 71 IN | TEMPERATURE: 98.5 F | BODY MASS INDEX: 31.5 KG/M2 | OXYGEN SATURATION: 94 % | WEIGHT: 225 LBS | RESPIRATION RATE: 14 BRPM | HEART RATE: 69 BPM | DIASTOLIC BLOOD PRESSURE: 64 MMHG

## 2023-01-11 DIAGNOSIS — J06.9 UPPER RESPIRATORY TRACT INFECTION, UNSPECIFIED TYPE: ICD-10-CM

## 2023-01-11 PROCEDURE — 99214 OFFICE O/P EST MOD 30 MIN: CPT | Performed by: FAMILY MEDICINE

## 2023-01-11 RX ORDER — BENZONATATE 100 MG/1
100 CAPSULE ORAL 3 TIMES DAILY PRN
Qty: 60 CAPSULE | Refills: 0 | Status: SHIPPED | OUTPATIENT
Start: 2023-01-11 | End: 2023-10-13

## 2023-01-11 ASSESSMENT — FIBROSIS 4 INDEX: FIB4 SCORE: 1.41

## 2023-01-12 NOTE — PROGRESS NOTES
"Subjective:   Gregor Bonilla is a 62 y.o. male here today for   Chief Complaint   Patient presents with    Cough     X        #Cough  -Ongoing symptoms of cough last 5 to 6 days.  He states it started as a minor irritation of throat with intermittent cough that has worsened to the point where he is coughing constantly throughout the day.  Continues to have a slight pain in throat but this is not constant.  He is having slightly productive cough although denies any chest pain, shortness with, difficulty breathing.  He is also having some occasional muscle aches.  Denies any fevers, chills, congestion, headaches, sinus pain, change in hearing, ear fullness.    Allergies   Allergen Reactions    Gluten Meal Hives    Pcn [Penicillins]      Reaction unknown         Current medicines (including changes today)  Current Outpatient Medications   Medication Sig Dispense Refill    amlodipine-valsartan (EXFORGE) 5-160 MG per tablet Take 1 Tablet by mouth every day. 90 Tablet 2    atorvastatin (LIPITOR) 40 MG Tab       Ascorbic Acid (VITAMIN C) 1000 MG Tab Take  by mouth.      MISC NATURAL PRODUCTS PO Take  by mouth.      Omega-3 Fatty Acids (FISH OIL PO) Take  by mouth.      Misc Natural Products (TART CHERRY ADVANCED PO) Take  by mouth.      Turmeric (CURCUMIN 95 PO) Take  by mouth.       No current facility-administered medications for this visit.     He  has a past medical history of Hyperlipidemia and Hypertension.    ROS   -See HPI       Objective:     Physical Exam:  /64   Pulse 69   Temp 36.9 °C (98.5 °F) (Temporal)   Resp 14   Ht 1.8 m (5' 10.87\")   Wt 102 kg (225 lb)   SpO2 94%  Body mass index is 31.5 kg/m².   Constitutional: Alert, no distress.  Skin: Warm, dry, good turgor, no rashes in visible areas.  Eye: Equal, round and reactive, conjunctiva clear, lids normal.  ENMT: TM's clear bilaterally, lips without lesions, good dentition, oropharynx clear.  Neck: Trachea midline, no masses, no thyromegaly. No " cervical or supraclavicular lymphadenopathy.  Respiratory: Unlabored respiratory effort, lungs clear to auscultation, no wheezes, no rhonchi.  Cardiovascular: Normal S1, S2, no murmur, no edema.  Psych: Alert and oriented x3, normal affect and mood.    Assessment and Plan:     1. Upper respiratory tract infection, unspecified type  -Most likely viral in nature.  Patient has tested at home for COVID-19 which was negative.  Has received COVID-19 vaccines and flu vaccine.  Discussed continue conservative treatment measures with over-the-counter cough suppressants, analgesics as needed.  We will begin treatment this time with Tessalon.  Discussed strict return precautions, patient will follow-up as needed.  - benzonatate (TESSALON) 100 MG Cap; Take 1 Capsule by mouth 3 times a day as needed for Cough.  Dispense: 60 Capsule; Refill: 0      Followup: No follow-ups on file.         PLEASE NOTE: This dictation was created using voice recognition software. I have made every reasonable attempt to correct obvious errors, but I expect that there are errors of grammar and possibly content that I did not discover before finalizing the note.

## 2023-02-15 ENCOUNTER — PATIENT MESSAGE (OUTPATIENT)
Dept: MEDICAL GROUP | Facility: LAB | Age: 63
End: 2023-02-15
Payer: COMMERCIAL

## 2023-02-15 DIAGNOSIS — R68.82 LOW LIBIDO: ICD-10-CM

## 2023-02-27 ENCOUNTER — PATIENT MESSAGE (OUTPATIENT)
Dept: MEDICAL GROUP | Facility: LAB | Age: 63
End: 2023-02-27
Payer: COMMERCIAL

## 2023-02-27 DIAGNOSIS — R68.82 LOW LIBIDO: ICD-10-CM

## 2023-02-27 NOTE — TELEPHONE ENCOUNTER
Labs ordered.  Please make sure patient completes labs between 8 and 10 AM in the morning.  Thank you.

## 2023-05-09 ENCOUNTER — DOCUMENTATION (OUTPATIENT)
Dept: VASCULAR LAB | Facility: MEDICAL CENTER | Age: 63
End: 2023-05-09
Payer: COMMERCIAL

## 2023-05-19 NOTE — TELEPHONE ENCOUNTER
Received request via: Pharmacy    Was the patient seen in the last year in this department? Yes    Does the patient have an active prescription (recently filled or refills available) for medication(s) requested? No    Does the patient have long term Plus and need 100 day supply (blood pressure, diabetes and cholesterol meds only)? Patient does not have SCP    ATORVASTATIN 40 MG TABLET

## 2023-05-22 RX ORDER — ATORVASTATIN CALCIUM 40 MG/1
TABLET, FILM COATED ORAL
Qty: 90 TABLET | Refills: 3 | Status: SHIPPED | OUTPATIENT
Start: 2023-05-22

## 2023-05-26 ENCOUNTER — APPOINTMENT (OUTPATIENT)
Dept: VASCULAR LAB | Facility: MEDICAL CENTER | Age: 63
End: 2023-05-26
Payer: COMMERCIAL

## 2023-06-01 ENCOUNTER — APPOINTMENT (RX ONLY)
Dept: URBAN - METROPOLITAN AREA CLINIC 6 | Facility: CLINIC | Age: 63
Setting detail: DERMATOLOGY
End: 2023-06-01

## 2023-06-01 DIAGNOSIS — L81.4 OTHER MELANIN HYPERPIGMENTATION: ICD-10-CM

## 2023-06-01 DIAGNOSIS — L30.9 DERMATITIS, UNSPECIFIED: ICD-10-CM

## 2023-06-01 DIAGNOSIS — L82.0 INFLAMED SEBORRHEIC KERATOSIS: ICD-10-CM

## 2023-06-01 DIAGNOSIS — L82.1 OTHER SEBORRHEIC KERATOSIS: ICD-10-CM

## 2023-06-01 DIAGNOSIS — D22 MELANOCYTIC NEVI: ICD-10-CM

## 2023-06-01 DIAGNOSIS — Z71.89 OTHER SPECIFIED COUNSELING: ICD-10-CM

## 2023-06-01 DIAGNOSIS — D18.0 HEMANGIOMA: ICD-10-CM

## 2023-06-01 PROBLEM — D22.5 MELANOCYTIC NEVI OF TRUNK: Status: ACTIVE | Noted: 2023-06-01

## 2023-06-01 PROBLEM — D22.62 MELANOCYTIC NEVI OF LEFT UPPER LIMB, INCLUDING SHOULDER: Status: ACTIVE | Noted: 2023-06-01

## 2023-06-01 PROBLEM — D22.61 MELANOCYTIC NEVI OF RIGHT UPPER LIMB, INCLUDING SHOULDER: Status: ACTIVE | Noted: 2023-06-01

## 2023-06-01 PROBLEM — D22.71 MELANOCYTIC NEVI OF RIGHT LOWER LIMB, INCLUDING HIP: Status: ACTIVE | Noted: 2023-06-01

## 2023-06-01 PROBLEM — D22.72 MELANOCYTIC NEVI OF LEFT LOWER LIMB, INCLUDING HIP: Status: ACTIVE | Noted: 2023-06-01

## 2023-06-01 PROBLEM — D18.01 HEMANGIOMA OF SKIN AND SUBCUTANEOUS TISSUE: Status: ACTIVE | Noted: 2023-06-01

## 2023-06-01 PROCEDURE — 99203 OFFICE O/P NEW LOW 30 MIN: CPT | Mod: 25

## 2023-06-01 PROCEDURE — ? LIQUID NITROGEN

## 2023-06-01 PROCEDURE — ? PRESCRIPTION

## 2023-06-01 PROCEDURE — ? COUNSELING

## 2023-06-01 PROCEDURE — 17110 DESTRUCTION B9 LES UP TO 14: CPT

## 2023-06-01 PROCEDURE — ? ADDITIONAL NOTES

## 2023-06-01 RX ORDER — CLOTRIMAZOLE AND BETAMETHASONE DIPROPIONATE 10; .5 MG/G; MG/G
THIN LAYER CREAM TOPICAL BID
Qty: 15 | Refills: 2 | Status: ERX | COMMUNITY
Start: 2023-06-01

## 2023-06-01 RX ADMIN — CLOTRIMAZOLE AND BETAMETHASONE DIPROPIONATE THIN LAYER: 10; .5 CREAM TOPICAL at 00:00

## 2023-06-01 ASSESSMENT — LOCATION SIMPLE DESCRIPTION DERM
LOCATION SIMPLE: RIGHT LOWER BACK
LOCATION SIMPLE: LEFT FOREARM
LOCATION SIMPLE: LEFT POSTERIOR THIGH
LOCATION SIMPLE: CHEST
LOCATION SIMPLE: RIGHT UPPER ARM
LOCATION SIMPLE: LEFT UPPER BACK
LOCATION SIMPLE: RIGHT CALF
LOCATION SIMPLE: RIGHT CHEEK
LOCATION SIMPLE: RIGHT UPPER BACK
LOCATION SIMPLE: RIGHT FOREARM
LOCATION SIMPLE: LEFT UPPER ARM
LOCATION SIMPLE: LEFT THIGH
LOCATION SIMPLE: LEFT PRETIBIAL REGION
LOCATION SIMPLE: RIGHT THIGH
LOCATION SIMPLE: RIGHT POSTERIOR THIGH
LOCATION SIMPLE: GLUTEAL CLEFT
LOCATION SIMPLE: LEFT CHEEK
LOCATION SIMPLE: LEFT CALF

## 2023-06-01 ASSESSMENT — LOCATION DETAILED DESCRIPTION DERM
LOCATION DETAILED: LEFT PROXIMAL CALF
LOCATION DETAILED: LEFT INFERIOR UPPER BACK
LOCATION DETAILED: RIGHT PROXIMAL DORSAL FOREARM
LOCATION DETAILED: RIGHT LATERAL MALAR CHEEK
LOCATION DETAILED: RIGHT ANTERIOR DISTAL UPPER ARM
LOCATION DETAILED: LEFT CENTRAL MALAR CHEEK
LOCATION DETAILED: RIGHT DISTAL POSTERIOR THIGH
LOCATION DETAILED: RIGHT MEDIAL INFERIOR CHEST
LOCATION DETAILED: RIGHT VENTRAL PROXIMAL FOREARM
LOCATION DETAILED: LEFT VENTRAL DISTAL FOREARM
LOCATION DETAILED: LEFT ANTERIOR PROXIMAL THIGH
LOCATION DETAILED: RIGHT ANTERIOR DISTAL THIGH
LOCATION DETAILED: RIGHT SUPERIOR LATERAL MIDBACK
LOCATION DETAILED: RIGHT ANTERIOR PROXIMAL THIGH
LOCATION DETAILED: RIGHT PROXIMAL ULNAR DORSAL FOREARM
LOCATION DETAILED: LEFT PROXIMAL DORSAL FOREARM
LOCATION DETAILED: LEFT MEDIAL PROXIMAL PRETIBIAL REGION
LOCATION DETAILED: LEFT ANTERIOR DISTAL UPPER ARM
LOCATION DETAILED: RIGHT PROXIMAL CALF
LOCATION DETAILED: RIGHT VENTRAL DISTAL FOREARM
LOCATION DETAILED: GLUTEAL CLEFT
LOCATION DETAILED: LEFT DISTAL POSTERIOR THIGH
LOCATION DETAILED: RIGHT MID-UPPER BACK

## 2023-06-01 ASSESSMENT — LOCATION ZONE DERM
LOCATION ZONE: TRUNK
LOCATION ZONE: ARM
LOCATION ZONE: FACE
LOCATION ZONE: LEG

## 2023-06-01 NOTE — PROCEDURE: ADDITIONAL NOTES
Render Risk Assessment In Note?: yes
Detail Level: Simple
Additional Notes: Minimal rash today.  He states that clotrimazole-betamethasone cream helps for flares, few times a year. Would like refill.  Will send new prescription today.

## 2023-06-01 NOTE — PROCEDURE: LIQUID NITROGEN
Spray Paint Technique: No
Show Aperture Variable?: Yes
Detail Level: Detailed
Medical Necessity Clause: This procedure was medically necessary because the lesions that were treated were:
Spray Paint Text: The liquid nitrogen was applied to the skin utilizing a spray paint frosting technique.
Medical Necessity Information: It is in your best interest to select a reason for this procedure from the list below. All of these items fulfill various CMS LCD requirements except the new and changing color options.
Post-Care Instructions: I reviewed with the patient in detail post-care instructions. Patient is to wear sunprotection, and avoid picking at any of the treated lesions. Pt may apply Vaseline to crusted or scabbing areas.
Consent: The patient's consent was obtained including but not limited to risks of crusting, scabbing, blistering, scarring, darker or lighter pigmentary change, recurrence, incomplete removal and infection.

## 2023-07-17 ENCOUNTER — HOSPITAL ENCOUNTER (OUTPATIENT)
Dept: LAB | Facility: MEDICAL CENTER | Age: 63
End: 2023-07-17
Attending: FAMILY MEDICINE
Payer: COMMERCIAL

## 2023-07-17 DIAGNOSIS — R68.82 LOW LIBIDO: ICD-10-CM

## 2023-07-17 LAB
ALBUMIN SERPL BCP-MCNC: 4.4 G/DL (ref 3.2–4.9)
ALBUMIN/GLOB SERPL: 1.8 G/DL
ALP SERPL-CCNC: 72 U/L (ref 30–99)
ALT SERPL-CCNC: 53 U/L (ref 2–50)
ANION GAP SERPL CALC-SCNC: 9 MMOL/L (ref 7–16)
AST SERPL-CCNC: 30 U/L (ref 12–45)
BILIRUB SERPL-MCNC: 0.6 MG/DL (ref 0.1–1.5)
BUN SERPL-MCNC: 13 MG/DL (ref 8–22)
CALCIUM ALBUM COR SERPL-MCNC: 8.8 MG/DL (ref 8.5–10.5)
CALCIUM SERPL-MCNC: 9.1 MG/DL (ref 8.5–10.5)
CHLORIDE SERPL-SCNC: 103 MMOL/L (ref 96–112)
CO2 SERPL-SCNC: 27 MMOL/L (ref 20–33)
CREAT SERPL-MCNC: 0.87 MG/DL (ref 0.5–1.4)
ERYTHROCYTE [DISTWIDTH] IN BLOOD BY AUTOMATED COUNT: 43.1 FL (ref 35.9–50)
GFR SERPLBLD CREATININE-BSD FMLA CKD-EPI: 98 ML/MIN/1.73 M 2
GLOBULIN SER CALC-MCNC: 2.5 G/DL (ref 1.9–3.5)
GLUCOSE SERPL-MCNC: 118 MG/DL (ref 65–99)
HCT VFR BLD AUTO: 48.9 % (ref 42–52)
HGB BLD-MCNC: 16.5 G/DL (ref 14–18)
MCH RBC QN AUTO: 30.3 PG (ref 27–33)
MCHC RBC AUTO-ENTMCNC: 33.7 G/DL (ref 32.3–36.5)
MCV RBC AUTO: 89.7 FL (ref 81.4–97.8)
PLATELET # BLD AUTO: 170 K/UL (ref 164–446)
PMV BLD AUTO: 13.1 FL (ref 9–12.9)
POTASSIUM SERPL-SCNC: 4 MMOL/L (ref 3.6–5.5)
PROT SERPL-MCNC: 6.9 G/DL (ref 6–8.2)
RBC # BLD AUTO: 5.45 M/UL (ref 4.7–6.1)
SODIUM SERPL-SCNC: 139 MMOL/L (ref 135–145)
TSH SERPL DL<=0.005 MIU/L-ACNC: 4.51 UIU/ML (ref 0.38–5.33)
WBC # BLD AUTO: 6 K/UL (ref 4.8–10.8)

## 2023-07-17 PROCEDURE — 84403 ASSAY OF TOTAL TESTOSTERONE: CPT

## 2023-07-17 PROCEDURE — 84402 ASSAY OF FREE TESTOSTERONE: CPT

## 2023-07-17 PROCEDURE — 84270 ASSAY OF SEX HORMONE GLOBUL: CPT

## 2023-07-17 PROCEDURE — 85027 COMPLETE CBC AUTOMATED: CPT

## 2023-07-17 PROCEDURE — 36415 COLL VENOUS BLD VENIPUNCTURE: CPT

## 2023-07-17 PROCEDURE — 80053 COMPREHEN METABOLIC PANEL: CPT

## 2023-07-17 PROCEDURE — 84443 ASSAY THYROID STIM HORMONE: CPT

## 2023-07-19 LAB
SHBG SERPL-SCNC: 51 NMOL/L (ref 19–76)
TESTOST FREE MFR SERPL: 1.4 % (ref 1.6–2.9)
TESTOST FREE SERPL-MCNC: 64 PG/ML (ref 47–244)
TESTOST SERPL-MCNC: 447 NG/DL (ref 300–720)

## 2023-10-09 ENCOUNTER — APPOINTMENT (OUTPATIENT)
Dept: MEDICAL GROUP | Facility: LAB | Age: 63
End: 2023-10-09
Payer: COMMERCIAL

## 2023-10-13 ENCOUNTER — OFFICE VISIT (OUTPATIENT)
Dept: MEDICAL GROUP | Facility: LAB | Age: 63
End: 2023-10-13
Payer: COMMERCIAL

## 2023-10-13 VITALS
HEART RATE: 77 BPM | DIASTOLIC BLOOD PRESSURE: 88 MMHG | OXYGEN SATURATION: 93 % | TEMPERATURE: 97 F | WEIGHT: 239.6 LBS | HEIGHT: 71 IN | SYSTOLIC BLOOD PRESSURE: 132 MMHG | BODY MASS INDEX: 33.54 KG/M2

## 2023-10-13 DIAGNOSIS — J02.9 ACUTE PHARYNGITIS, UNSPECIFIED ETIOLOGY: ICD-10-CM

## 2023-10-13 DIAGNOSIS — K64.4 EXTERNAL HEMORRHOID, BLEEDING: ICD-10-CM

## 2023-10-13 LAB — S PYO DNA SPEC NAA+PROBE: NOT DETECTED

## 2023-10-13 PROCEDURE — 3075F SYST BP GE 130 - 139MM HG: CPT | Performed by: STUDENT IN AN ORGANIZED HEALTH CARE EDUCATION/TRAINING PROGRAM

## 2023-10-13 PROCEDURE — 3079F DIAST BP 80-89 MM HG: CPT | Performed by: STUDENT IN AN ORGANIZED HEALTH CARE EDUCATION/TRAINING PROGRAM

## 2023-10-13 PROCEDURE — 87651 STREP A DNA AMP PROBE: CPT | Performed by: STUDENT IN AN ORGANIZED HEALTH CARE EDUCATION/TRAINING PROGRAM

## 2023-10-13 PROCEDURE — 99214 OFFICE O/P EST MOD 30 MIN: CPT | Performed by: STUDENT IN AN ORGANIZED HEALTH CARE EDUCATION/TRAINING PROGRAM

## 2023-10-13 RX ORDER — ASPIRIN 81 MG/1
TABLET, CHEWABLE ORAL
COMMUNITY
Start: 2021-08-17

## 2023-10-13 ASSESSMENT — ENCOUNTER SYMPTOMS
CHILLS: 0
ABDOMINAL PAIN: 0
NAUSEA: 0
SHORTNESS OF BREATH: 0
FEVER: 0
SORE THROAT: 1
DIARRHEA: 0
BLOOD IN STOOL: 1
DIZZINESS: 0
CONSTIPATION: 0
HEADACHES: 0

## 2023-10-13 ASSESSMENT — FIBROSIS 4 INDEX: FIB4 SCORE: 1.48

## 2023-10-13 ASSESSMENT — PATIENT HEALTH QUESTIONNAIRE - PHQ9: CLINICAL INTERPRETATION OF PHQ2 SCORE: 0

## 2023-10-13 NOTE — PROGRESS NOTES
Subjective:     Chief Complaint   Patient presents with    Pharyngitis     Oct 1st      HPI:   Gregor presents today sore throat for 12 days.  PCP unavailable.    Also has concern for new painless rectal bleeding.    Patient states his symptoms started on October 1 with some slight sinus congestion and postnasal drip along with a minimal tickle like cough last week in addition to a sore throat.  Was also more fatigued than usual and slept a lot.  The fatigue has improved.  States that over the weekend it resolved but then at work when he had to talk for hours the sore throat came back.  He has not had any fever or chills.  Used throat lozenges and ibuprofen last week with relief.  Denies history of allergies.  No known sick contacts.  Improved today, was slightly worse yesterday.    Patient would also like to discuss painless rectal bleeding that developed recently.  Patient states that prior to onset he had eat a lot more than usual after a family gathering and had been sitting more.  Denies any constipation or diarrhea.  States that his mother has a history of colorectal cancer.  States that his colonoscopy was normal in April 2021 when he was living in [place].  States that he has had this issue in the past but not frequent.  States that the painless bright red rectal bleeding is scant and only occurs with wiping after a bowel movement.  With wiping he can feel a small mass the size of a half marble which is not pruritic or tender and seems to come and go.  Has not noticed it today.  Denies any other GI complaints.    Review of Systems   Constitutional:  Positive for malaise/fatigue. Negative for chills and fever.   HENT:  Positive for congestion and sore throat. Negative for ear pain.    Respiratory:  Negative for shortness of breath.    Cardiovascular:  Negative for chest pain.   Gastrointestinal:  Positive for blood in stool (bright red). Negative for abdominal pain, constipation, diarrhea and nausea.   Skin:   "Negative for itching.   Neurological:  Negative for dizziness and headaches.     Objective:     Exam:  /88 (BP Location: Left arm, Patient Position: Sitting, BP Cuff Size: Adult)   Pulse 77   Temp 36.1 °C (97 °F) (Temporal)   Ht 1.803 m (5' 11\")   Wt 109 kg (239 lb 9.6 oz)   SpO2 93%   BMI 33.42 kg/m²  Body mass index is 33.42 kg/m².    Physical Exam  Vitals reviewed. Exam conducted with a chaperone present (Malaika Hill MA).   Constitutional:       General: He is not in acute distress.     Appearance: Normal appearance. He is obese. He is not ill-appearing.   HENT:      Head: Normocephalic and atraumatic.      Right Ear: Ear canal and external ear normal. Tympanic membrane is scarred.      Left Ear: Tympanic membrane, ear canal and external ear normal.      Nose: Mucosal edema (minimal bilat) and rhinorrhea (scant bilat) present. No congestion.      Right Sinus: No maxillary sinus tenderness or frontal sinus tenderness.      Left Sinus: No maxillary sinus tenderness or frontal sinus tenderness.      Mouth/Throat:      Mouth: Mucous membranes are moist.      Pharynx: Posterior oropharyngeal erythema (mild with cobblestoning) present. No oropharyngeal exudate.      Comments: No trismus, drooling or hoarseness/muffled voice.  Eyes:      General: No scleral icterus.     Conjunctiva/sclera: Conjunctivae normal.   Cardiovascular:      Rate and Rhythm: Normal rate and regular rhythm.      Heart sounds: Normal heart sounds. No murmur heard.  Pulmonary:      Effort: Pulmonary effort is normal. No respiratory distress.      Breath sounds: Normal breath sounds. No stridor. No wheezing, rhonchi or rales.   Abdominal:      General: Abdomen is flat. Bowel sounds are normal. There is no distension.      Palpations: Abdomen is soft. There is no mass.      Tenderness: There is no abdominal tenderness. There is no guarding or rebound.      Hernia: No hernia is present.   Genitourinary:     Prostate: Not tender and no " nodules present.      Rectum: External hemorrhoid (Single very small external hemorrhoid posterior left with minimal tenderness without bleeding or thrombosis.) present. No mass, tenderness, anal fissure or internal hemorrhoid. Normal anal tone.   Musculoskeletal:      Cervical back: Normal range of motion and neck supple. No rigidity or tenderness.      Right lower leg: No edema.      Left lower leg: No edema.   Lymphadenopathy:      Cervical: No cervical adenopathy.   Skin:     General: Skin is warm and dry.      Coloration: Skin is not jaundiced.   Neurological:      General: No focal deficit present.      Mental Status: He is alert and oriented to person, place, and time.   Psychiatric:         Mood and Affect: Mood normal.         Behavior: Behavior normal.         Thought Content: Thought content normal.       Assessment & Plan:     62 y.o. male with the following -     1. Acute pharyngitis, unspecified etiology  This is an acute condition.  Strep negative.  Discussed potential etiologies including viral pharyngitis and allergies-viral etiology seems more likely with other symptoms present at onset.  Reasonable given postnasal drip to consider trial of an over-the-counter nonsedating 24-hour antihistamine or Flonase.  Okay to continue over-the-counter analgesics like Tylenol or ibuprofen, throat lozenges and analgesic throat spray.  May benefit again from vocal rest.  Gave return precautions.  Patient appreciative.  - POCT CEPHEID GROUP A STREP - PCR    2. External hemorrhoid, bleeding  This is an acute condition, patient reports colonoscopy up-to-date with a 5-year recall due in 2026.  Discussed conservative measures such as ensuring soft bowel movements with activity/hydration and fiber, avoid straining or prolonged sitting and sits baths.  Okay to use over-the-counter topical skin barrier such as Preparation H externally as needed.  Gave return precautions.    I spent a total of 31 minutes with record  review, exam, communication with the patient, and documentation of this encounter.    Return if symptoms worsen or fail to improve.    Please note that this dictation was created using voice recognition software. I have made every reasonable attempt to correct obvious errors, but I expect that there are errors of grammar and possibly content that I did not discover before finalizing the note.

## 2023-10-16 ENCOUNTER — APPOINTMENT (OUTPATIENT)
Dept: MEDICAL GROUP | Facility: LAB | Age: 63
End: 2023-10-16
Payer: COMMERCIAL

## 2024-02-21 ENCOUNTER — OFFICE VISIT (OUTPATIENT)
Dept: MEDICAL GROUP | Facility: LAB | Age: 64
End: 2024-02-21
Payer: COMMERCIAL

## 2024-02-21 VITALS
HEART RATE: 76 BPM | OXYGEN SATURATION: 95 % | HEIGHT: 71 IN | DIASTOLIC BLOOD PRESSURE: 74 MMHG | BODY MASS INDEX: 34.02 KG/M2 | SYSTOLIC BLOOD PRESSURE: 114 MMHG | TEMPERATURE: 97 F | WEIGHT: 243 LBS

## 2024-02-21 DIAGNOSIS — R35.1 BENIGN PROSTATIC HYPERPLASIA WITH NOCTURIA: ICD-10-CM

## 2024-02-21 DIAGNOSIS — N40.1 BENIGN PROSTATIC HYPERPLASIA WITH NOCTURIA: ICD-10-CM

## 2024-02-21 DIAGNOSIS — Z01.89 PATIENT REQUESTED DIAGNOSTIC TESTING: ICD-10-CM

## 2024-02-21 DIAGNOSIS — G43.809 OTHER MIGRAINE WITHOUT STATUS MIGRAINOSUS, NOT INTRACTABLE: ICD-10-CM

## 2024-02-21 DIAGNOSIS — M1A.0710 IDIOPATHIC CHRONIC GOUT OF RIGHT FOOT WITHOUT TOPHUS: ICD-10-CM

## 2024-02-21 DIAGNOSIS — Z12.5 ENCOUNTER FOR SCREENING FOR MALIGNANT NEOPLASM OF PROSTATE: ICD-10-CM

## 2024-02-21 DIAGNOSIS — Z11.4 SCREENING FOR HIV (HUMAN IMMUNODEFICIENCY VIRUS): ICD-10-CM

## 2024-02-21 DIAGNOSIS — E78.5 HYPERLIPIDEMIA, UNSPECIFIED HYPERLIPIDEMIA TYPE: ICD-10-CM

## 2024-02-21 PROCEDURE — 99214 OFFICE O/P EST MOD 30 MIN: CPT | Performed by: FAMILY MEDICINE

## 2024-02-21 PROCEDURE — 3074F SYST BP LT 130 MM HG: CPT | Performed by: FAMILY MEDICINE

## 2024-02-21 PROCEDURE — 3078F DIAST BP <80 MM HG: CPT | Performed by: FAMILY MEDICINE

## 2024-02-21 RX ORDER — TAMSULOSIN HYDROCHLORIDE 0.4 MG/1
0.4 CAPSULE ORAL
Qty: 90 CAPSULE | Refills: 3 | Status: SHIPPED | OUTPATIENT
Start: 2024-02-21

## 2024-02-21 ASSESSMENT — PATIENT HEALTH QUESTIONNAIRE - PHQ9: CLINICAL INTERPRETATION OF PHQ2 SCORE: 0

## 2024-02-21 ASSESSMENT — FIBROSIS 4 INDEX: FIB4 SCORE: 1.5

## 2024-02-21 NOTE — PROGRESS NOTES
Verbal consent was acquired by the patient to use [employer] ambient listening note generation during this visit Yes    Subjective:   Gregor Bonilla is a 63 y.o. male here today for   Chief Complaint   Patient presents with    Headache     On Saturday for 2 days     Pain     Tenderness on right eye   When closing eyes or rubbing eyes      History of Present Illness  The patient presents for evaluation of multiple medical concerns.    His first headache was roughly 2 to 3 months ago, which was very slight and during the headache, both of his eyes felt tender. He had slight pain if he would drop them and he could notice it when he opened and closed his eyes. It lasted about a week and went away. This weekend, he had a headache for a couple of days. The headache went away, but then his right eye started feeling tender. It has been gradually getting less tender each day. Yesterday, he went to see an ophthalmologist and was told that there was nothing wrong in either eye and no signs of glaucoma. He had early appropriate cataracts in the background, but nothing that she said could cause that symptom. He barely feels it is much less than yesterday. He can not remember his old headache, but on the latest one, it was at the top of his head for the first day and then it moved to just above the eyes and then it went away. He describes it as a dull ache. He denies any light sensitivity, nausea, or vomiting. When he first had the headache, he did not have the eye tenderness. He has a history of ocular migraines for 30 years. He gets them somewhere between 0 and 4 times a year, but this is different than what he has experienced before. He did not take anything for the headache. He denies any numbness, tingling, facial drooping, difficulty speaking, or swallowing. He denies any sinus pain or pressure. The headaches do not wake him up from sleep in the middle of the night.    He had blood work and an annual physical 10 months  "ago. Since then, he had 1 testosterone test that was slightly low. His sexual desire is decreased. He is trying to reduce his weight.    He has not had a gout attack for at least 2 years, but he did have them before. He is taking his blood pressure medications.    He has noticed that once every 2 weeks or once a month, he feels like he has difficulty finishing urinating. This happens more at night. He denies any difficulty starting or stopping. He wakes up 2-4 times at night to urinate if he does not drink much water. He drinks a lot of decaf tea at night.        Allergies   Allergen Reactions    Gluten Meal Hives    Pcn [Penicillins]      Reaction unknown         Current medicines (including changes today)  Current Outpatient Medications   Medication Sig Dispense Refill    aspirin (ASA) 81 MG Chew Tab chewable tablet ASPIRIN 81 MG CHEW      amlodipine-valsartan (EXFORGE) 5-160 MG per tablet TAKE 1 TABLET BY MOUTH EVERY DAY 30 Tablet 8    atorvastatin (LIPITOR) 40 MG Tab TAKE 1 TABLET BY MOUTH EVERY EVENING 90 Tablet 3    Ascorbic Acid (VITAMIN C) 1000 MG Tab Take  by mouth.      Omega-3 Fatty Acids (FISH OIL PO) Take  by mouth.      Misc Natural Products (TART CHERRY ADVANCED PO) Take  by mouth.      Turmeric (CURCUMIN 95 PO) Take  by mouth.       No current facility-administered medications for this visit.     He  has a past medical history of Hyperlipidemia and Hypertension.    ROS   ROS  -See HPI     Objective:     Physical Exam:  /74 (BP Location: Left arm, Patient Position: Sitting)   Pulse 76   Temp 36.1 °C (97 °F) (Temporal)   Ht 1.803 m (5' 11\")   Wt 110 kg (243 lb)   SpO2 95%  Body mass index is 33.89 kg/m².   Constitutional: Alert, no distress.  Skin: Warm, dry, good turgor, no rashes in visible areas.  Eye: Equal, round and reactive, conjunctiva clear, lids normal.  Respiratory: Unlabored respiratory effort, lungs clear to auscultation, no wheezes, no rhonchi.  Cardiovascular: Normal S1, S2, " no murmur, no edema.  Psych: Alert and oriented x3, normal affect and mood.  Neuro: Cranial nerves I through XII are intact, no focal motor/sensory deficits.  Results  Laboratory Studies  Labs were reviewed with the patient.    Assessment and Plan:     Assessment & Plan  1. Headaches.  Problem, uncertain prognosis  It sounds more migraine-like. There are no red flag symptoms. He will keep me posted on the headaches. If his headaches worsen, he will let me know as further imaging may be needed at that time.\    2. Low testosterone.  I will order routine labs. We discussed the risks and benefits of testosterone supplementation.    3. Benign prostate hyperplasia.  New problem given symptoms of postvoid difficulty and nocturia.  I will prescribe Flomax.  Discussed appropriate medication and potential side effects    4. Gout.  His last gout attack was in 2021/2. I will check his uric acid.    5.  Health maintenance  Screening labs ordered as below    Follow-up  If he has any concerns, he will reach out to me.    Orders:  1. Other migraine without status migrainosus, not intractable  - CBC WITHOUT DIFFERENTIAL; Future  - Comp Metabolic Panel; Future    2. Benign prostatic hyperplasia with nocturia  - tamsulosin (FLOMAX) 0.4 MG capsule; Take 1 Capsule by mouth 1/2 hour after breakfast.  Dispense: 90 Capsule; Refill: 3    3. Idiopathic chronic gout of right foot without tophus  - URIC ACID; Future    4. Screening for HIV (human immunodeficiency virus)  - HIV AG/AB COMBO ASSAY SCREENING; Future    5. Hyperlipidemia, unspecified hyperlipidemia type  - Lipid Profile; Future    6. Encounter for screening for malignant neoplasm of prostate  - PROSTATE SPECIFIC AG SCREENING; Future    7. Patient requested diagnostic testing  - Testosterone, Free & Total, Adult Male (w/SHBG); Future      Followup: No follow-ups on file.         PLEASE NOTE: This dictation was created using voice recognition and [employer] ambient listening  software. I have made every reasonable attempt to correct obvious errors, but I expect that there are errors of grammar and possibly content that I did not discover before finalizing the note.

## 2024-04-02 RX ORDER — AMLODIPINE AND VALSARTAN 5; 160 MG/1; MG/1
1 TABLET ORAL DAILY
Qty: 30 TABLET | Refills: 8 | Status: SHIPPED | OUTPATIENT
Start: 2024-04-02

## 2024-04-02 NOTE — TELEPHONE ENCOUNTER
Received request via: Pharmacy    Was the patient seen in the last year in this department? Yes  2/21/24  Does the patient have an active prescription (recently filled or refills available) for medication(s) requested? No    [facility]    Does the patient have [facility] and need 100 day supply (blood pressure, diabetes and cholesterol meds only)? Patient does not have SCP

## 2024-05-03 DIAGNOSIS — E78.5 DYSLIPIDEMIA: ICD-10-CM

## 2024-05-03 RX ORDER — ATORVASTATIN CALCIUM 40 MG/1
40 TABLET, FILM COATED ORAL EVERY EVENING
Qty: 90 TABLET | Refills: 3 | Status: SHIPPED | OUTPATIENT
Start: 2024-05-03

## 2024-05-03 NOTE — TELEPHONE ENCOUNTER
Received request via: Pharmacy    Was the patient seen in the last year in this department? Yes  2/21/24  Does the patient have an active prescription (recently filled or refills available) for medication(s) requested? No    [facility]    Does the patient have [facility] and need 100 day supply (blood pressure, diabetes and cholesterol meds only)? Patient does not have SCP

## 2024-05-15 ENCOUNTER — APPOINTMENT (RX ONLY)
Dept: URBAN - METROPOLITAN AREA CLINIC 6 | Facility: CLINIC | Age: 64
Setting detail: DERMATOLOGY
End: 2024-05-15

## 2024-05-15 DIAGNOSIS — D22 MELANOCYTIC NEVI: ICD-10-CM

## 2024-05-15 DIAGNOSIS — L81.4 OTHER MELANIN HYPERPIGMENTATION: ICD-10-CM

## 2024-05-15 DIAGNOSIS — Z71.89 OTHER SPECIFIED COUNSELING: ICD-10-CM

## 2024-05-15 DIAGNOSIS — D18.0 HEMANGIOMA: ICD-10-CM

## 2024-05-15 DIAGNOSIS — L82.1 OTHER SEBORRHEIC KERATOSIS: ICD-10-CM

## 2024-05-15 DIAGNOSIS — I87.2 VENOUS INSUFFICIENCY (CHRONIC) (PERIPHERAL): ICD-10-CM

## 2024-05-15 PROBLEM — D22.5 MELANOCYTIC NEVI OF TRUNK: Status: ACTIVE | Noted: 2024-05-15

## 2024-05-15 PROBLEM — D22.72 MELANOCYTIC NEVI OF LEFT LOWER LIMB, INCLUDING HIP: Status: ACTIVE | Noted: 2024-05-15

## 2024-05-15 PROBLEM — D22.71 MELANOCYTIC NEVI OF RIGHT LOWER LIMB, INCLUDING HIP: Status: ACTIVE | Noted: 2024-05-15

## 2024-05-15 PROBLEM — D22.61 MELANOCYTIC NEVI OF RIGHT UPPER LIMB, INCLUDING SHOULDER: Status: ACTIVE | Noted: 2024-05-15

## 2024-05-15 PROBLEM — D22.62 MELANOCYTIC NEVI OF LEFT UPPER LIMB, INCLUDING SHOULDER: Status: ACTIVE | Noted: 2024-05-15

## 2024-05-15 PROBLEM — D18.01 HEMANGIOMA OF SKIN AND SUBCUTANEOUS TISSUE: Status: ACTIVE | Noted: 2024-05-15

## 2024-05-15 PROCEDURE — 99213 OFFICE O/P EST LOW 20 MIN: CPT

## 2024-05-15 PROCEDURE — ? COUNSELING

## 2024-05-15 ASSESSMENT — LOCATION SIMPLE DESCRIPTION DERM
LOCATION SIMPLE: LEFT CALF
LOCATION SIMPLE: RIGHT POSTERIOR THIGH
LOCATION SIMPLE: RIGHT CALF
LOCATION SIMPLE: CHEST
LOCATION SIMPLE: LEFT UPPER ARM
LOCATION SIMPLE: LEFT CHEEK
LOCATION SIMPLE: RIGHT THIGH
LOCATION SIMPLE: LEFT FOREARM
LOCATION SIMPLE: RIGHT FOREARM
LOCATION SIMPLE: RIGHT UPPER ARM
LOCATION SIMPLE: LEFT UPPER BACK
LOCATION SIMPLE: RIGHT LOWER BACK
LOCATION SIMPLE: RIGHT UPPER BACK
LOCATION SIMPLE: RIGHT PRETIBIAL REGION
LOCATION SIMPLE: LEFT PRETIBIAL REGION
LOCATION SIMPLE: LEFT THIGH
LOCATION SIMPLE: LEFT POSTERIOR THIGH

## 2024-05-15 ASSESSMENT — LOCATION DETAILED DESCRIPTION DERM
LOCATION DETAILED: RIGHT ANTERIOR DISTAL UPPER ARM
LOCATION DETAILED: LEFT CENTRAL MALAR CHEEK
LOCATION DETAILED: RIGHT VENTRAL DISTAL FOREARM
LOCATION DETAILED: RIGHT PROXIMAL DORSAL FOREARM
LOCATION DETAILED: RIGHT PROXIMAL CALF
LOCATION DETAILED: LEFT INFERIOR UPPER BACK
LOCATION DETAILED: LEFT DISTAL POSTERIOR THIGH
LOCATION DETAILED: LEFT PROXIMAL PRETIBIAL REGION
LOCATION DETAILED: RIGHT SUPERIOR LATERAL MIDBACK
LOCATION DETAILED: RIGHT MID-UPPER BACK
LOCATION DETAILED: RIGHT ANTERIOR PROXIMAL THIGH
LOCATION DETAILED: RIGHT MEDIAL INFERIOR CHEST
LOCATION DETAILED: RIGHT DISTAL PRETIBIAL REGION
LOCATION DETAILED: LEFT VENTRAL DISTAL FOREARM
LOCATION DETAILED: LEFT ANTERIOR PROXIMAL THIGH
LOCATION DETAILED: LEFT PROXIMAL DORSAL FOREARM
LOCATION DETAILED: LEFT PROXIMAL CALF
LOCATION DETAILED: RIGHT DISTAL POSTERIOR THIGH
LOCATION DETAILED: LEFT ANTERIOR DISTAL UPPER ARM
LOCATION DETAILED: RIGHT VENTRAL PROXIMAL FOREARM

## 2024-05-15 ASSESSMENT — LOCATION ZONE DERM
LOCATION ZONE: FACE
LOCATION ZONE: LEG
LOCATION ZONE: ARM
LOCATION ZONE: TRUNK

## 2024-09-04 ENCOUNTER — APPOINTMENT (OUTPATIENT)
Dept: MEDICAL GROUP | Facility: LAB | Age: 64
End: 2024-09-04
Payer: COMMERCIAL

## 2024-09-05 ENCOUNTER — OFFICE VISIT (OUTPATIENT)
Dept: MEDICAL GROUP | Facility: LAB | Age: 64
End: 2024-09-05
Payer: COMMERCIAL

## 2024-09-05 VITALS
RESPIRATION RATE: 18 BRPM | OXYGEN SATURATION: 94 % | WEIGHT: 243 LBS | SYSTOLIC BLOOD PRESSURE: 114 MMHG | HEART RATE: 77 BPM | BODY MASS INDEX: 33.89 KG/M2 | TEMPERATURE: 97.3 F | DIASTOLIC BLOOD PRESSURE: 72 MMHG

## 2024-09-05 DIAGNOSIS — E66.9 OBESITY (BMI 30-39.9): ICD-10-CM

## 2024-09-05 DIAGNOSIS — S83.222A PERIPHERAL TEAR OF MEDIAL MENISCUS OF LEFT KNEE AS CURRENT INJURY, INITIAL ENCOUNTER: ICD-10-CM

## 2024-09-05 DIAGNOSIS — S83.221A PERIPHERAL TEAR OF MEDIAL MENISCUS OF RIGHT KNEE AS CURRENT INJURY, INITIAL ENCOUNTER: ICD-10-CM

## 2024-09-05 PROCEDURE — 99214 OFFICE O/P EST MOD 30 MIN: CPT | Performed by: FAMILY MEDICINE

## 2024-09-05 PROCEDURE — 3074F SYST BP LT 130 MM HG: CPT | Performed by: FAMILY MEDICINE

## 2024-09-05 PROCEDURE — 3078F DIAST BP <80 MM HG: CPT | Performed by: FAMILY MEDICINE

## 2024-09-05 ASSESSMENT — FIBROSIS 4 INDEX: FIB4 SCORE: 1.5

## 2024-09-05 NOTE — PROGRESS NOTES
Verbal consent was acquired by the patient to use [employer] ambient listening note generation during this visit Yes    Subjective:   Gregor Bonilla is a 63 y.o. male here today for   No chief complaint on file.    History of Present Illness  The patient is a 63-year-old male who presents today with concerns regarding bilateral knee pain and potential meniscal injury.    A month ago, he began experiencing a loud noise in his left knee when bearing weight on it. Despite seeking sports massage therapy, the issue persisted. During a holiday, his knee started to swell. An MRI of both knees was conducted, revealing a medial meniscus tear in each knee. A subsequent review of the MRI disc by a friend suggested an additional flap tear in the left knee.    He has been receiving acupuncture treatment for the past 10 days, which has led to a reduction in swelling and noise. He has also limited his physical activity, avoiding walking and standing, and has been performing 20-minute sessions on an exercise bike every two days. He continues his weight training regimen, excluding leg exercises, and practices Narinder Chi a few times a week. He has been stretching his calf and quads to alleviate pressure on the knee. The noise from his knee has significantly decreased, occurring only during the last 10 percent of extension.    He is considering further acupuncture treatment and other conservative measures. If no significant improvement is observed within two weeks, he plans to consult a sports medicine doctor for potential PRP or other biologic injections. He prefers to avoid surgery due to the absence of pain. He reports that his knee felt loose three to four weeks ago, but this sensation has since lessened. He has refrained from hiking as advised.    He acknowledges that his current weight of 240 pounds could be contributing to his knee problem and expresses interest in weight loss medications. He has an appointment scheduled with  Dr. Irina Jain on 09/13/2024.      Allergies   Allergen Reactions    Gluten Meal Hives    Pcn [Penicillins]      Reaction unknown         Current medicines (including changes today)  Current Outpatient Medications   Medication Sig Dispense Refill    atorvastatin (LIPITOR) 40 MG Tab TAKE 1 TABLET BY MOUTH EVERY DAY IN THE EVENING 90 Tablet 3    amlodipine-valsartan (EXFORGE) 5-160 MG per tablet TAKE 1 TABLET BY MOUTH EVERY DAY 30 Tablet 8    tamsulosin (FLOMAX) 0.4 MG capsule Take 1 Capsule by mouth 1/2 hour after breakfast. 90 Capsule 3    aspirin (ASA) 81 MG Chew Tab chewable tablet ASPIRIN 81 MG CHEW      Ascorbic Acid (VITAMIN C) 1000 MG Tab Take  by mouth.      Omega-3 Fatty Acids (FISH OIL PO) Take  by mouth.      Misc Natural Products (TART CHERRY ADVANCED PO) Take  by mouth.      Turmeric (CURCUMIN 95 PO) Take  by mouth.       No current facility-administered medications for this visit.     He  has a past medical history of Hyperlipidemia and Hypertension.    ROS   ROS  -See HPI     Objective:     Physical Exam:  /72   Pulse 77   Temp 36.3 °C (97.3 °F) (Temporal)   Resp 18   Wt 110 kg (243 lb)   SpO2 94%  Body mass index is 33.89 kg/m².   Constitutional: Alert, no distress, well-groomed.  Skin: No rashes in visible areas.  Eye: Round. Conjunctiva clear, lids normal. No icterus.   ENMT: Lips pink without lesions, good dentition, moist mucous membranes. Phonation normal.  Neck: No masses, no thyromegaly. Moves freely without pain.  Respiratory: Unlabored respiratory effort, no cough or audible wheeze  Psych: Alert and oriented x3, normal affect and mood.     Results  Imaging  MRI of both knees showed medial meniscus tear in both knees and a possible additional flap tear in the left knee.    Assessment and Plan:     Assessment & Plan  1. Bilateral medial meniscus tear.  The patient reports a medial meniscus tear in both knees, confirmed by an MRI. He has been experiencing significant noise in the  left knee, which has reduced with conservative measures such as acupuncture, reduced activity, and stretching. There is no pain, but the noise persists in the last 10% of extension. A referral to Dr. Irina Jain for further evaluation and potential PRP treatment has been arranged. Additionally, a referral for physical therapy at a local clinic has been made to strengthen the musculature around the knee and improve stability. If the conservative measures do not show significant improvement in two weeks, he will consider PRP or other biologic shots. If necessary, he will inform about the need for a referral to [facility] or his acupuncturist.    2. Weight management.  He acknowledges that weighing 240 pounds may not be ideal for his knee condition. He has been advised to focus on weight loss through diet and exercise. The potential benefits and risks of weight loss medications were discussed, including the possibility of gastrointestinal issues and the importance of lifestyle changes to maintain weight loss after discontinuing the medication. He will consider these options if he struggles with weight loss independently.      Orders:  1. Peripheral tear of medial meniscus of left knee as current injury, initial encounter  - Referral to Sports Medicine  - Referral to Physical Therapy    2. Peripheral tear of medial meniscus of right knee as current injury, initial encounter  - Referral to Sports Medicine  - Referral to Physical Therapy    3. Obesity (BMI 30-39.9)        Followup: No follow-ups on file.         PLEASE NOTE: This dictation was created using voice recognition and [employer] ambient listening software. I have made every reasonable attempt to correct obvious errors, but I expect that there are errors of grammar and possibly content that I did not discover before finalizing the note.

## 2024-09-06 ENCOUNTER — APPOINTMENT (OUTPATIENT)
Dept: MEDICAL GROUP | Facility: LAB | Age: 64
End: 2024-09-06
Payer: COMMERCIAL

## 2024-09-11 ENCOUNTER — TELEPHONE (OUTPATIENT)
Dept: MEDICAL GROUP | Facility: LAB | Age: 64
End: 2024-09-11
Payer: COMMERCIAL

## 2024-09-11 DIAGNOSIS — S83.222A PERIPHERAL TEAR OF MEDIAL MENISCUS OF LEFT KNEE AS CURRENT INJURY, INITIAL ENCOUNTER: ICD-10-CM

## 2024-09-11 DIAGNOSIS — S83.221A PERIPHERAL TEAR OF MEDIAL MENISCUS OF RIGHT KNEE AS CURRENT INJURY, INITIAL ENCOUNTER: ICD-10-CM

## 2024-10-02 ENCOUNTER — APPOINTMENT (RX ONLY)
Dept: URBAN - METROPOLITAN AREA CLINIC 6 | Facility: CLINIC | Age: 64
Setting detail: DERMATOLOGY
End: 2024-10-02

## 2024-10-02 PROBLEM — D48.5 NEOPLASM OF UNCERTAIN BEHAVIOR OF SKIN: Status: ACTIVE | Noted: 2024-10-02

## 2024-10-02 PROCEDURE — ? BIOPSY BY SHAVE METHOD

## 2024-10-02 PROCEDURE — 11102 TANGNTL BX SKIN SINGLE LES: CPT

## 2025-01-02 ENCOUNTER — HOSPITAL ENCOUNTER (OUTPATIENT)
Dept: LAB | Facility: MEDICAL CENTER | Age: 65
End: 2025-01-02
Attending: FAMILY MEDICINE
Payer: COMMERCIAL

## 2025-01-02 DIAGNOSIS — M1A.0710 IDIOPATHIC CHRONIC GOUT OF RIGHT FOOT WITHOUT TOPHUS: ICD-10-CM

## 2025-01-02 DIAGNOSIS — Z12.5 ENCOUNTER FOR SCREENING FOR MALIGNANT NEOPLASM OF PROSTATE: ICD-10-CM

## 2025-01-02 DIAGNOSIS — G43.809 OTHER MIGRAINE WITHOUT STATUS MIGRAINOSUS, NOT INTRACTABLE: ICD-10-CM

## 2025-01-02 DIAGNOSIS — Z11.4 SCREENING FOR HIV (HUMAN IMMUNODEFICIENCY VIRUS): ICD-10-CM

## 2025-01-02 DIAGNOSIS — Z01.89 PATIENT REQUESTED DIAGNOSTIC TESTING: ICD-10-CM

## 2025-01-02 DIAGNOSIS — E78.5 HYPERLIPIDEMIA, UNSPECIFIED HYPERLIPIDEMIA TYPE: ICD-10-CM

## 2025-01-02 PROCEDURE — 80053 COMPREHEN METABOLIC PANEL: CPT

## 2025-01-02 PROCEDURE — 84402 ASSAY OF FREE TESTOSTERONE: CPT

## 2025-01-02 PROCEDURE — 36415 COLL VENOUS BLD VENIPUNCTURE: CPT

## 2025-01-02 PROCEDURE — 84403 ASSAY OF TOTAL TESTOSTERONE: CPT

## 2025-01-02 PROCEDURE — 80061 LIPID PANEL: CPT

## 2025-01-02 PROCEDURE — 84270 ASSAY OF SEX HORMONE GLOBUL: CPT

## 2025-01-02 PROCEDURE — 84153 ASSAY OF PSA TOTAL: CPT

## 2025-01-02 PROCEDURE — 84550 ASSAY OF BLOOD/URIC ACID: CPT

## 2025-01-02 PROCEDURE — 87389 HIV-1 AG W/HIV-1&-2 AB AG IA: CPT

## 2025-01-02 RX ORDER — AMLODIPINE AND VALSARTAN 5; 160 MG/1; MG/1
1 TABLET ORAL DAILY
Qty: 90 TABLET | Refills: 0 | Status: SHIPPED | OUTPATIENT
Start: 2025-01-02

## 2025-01-03 LAB
ALBUMIN SERPL BCP-MCNC: 4.2 G/DL (ref 3.2–4.9)
ALBUMIN/GLOB SERPL: 1.6 G/DL
ALP SERPL-CCNC: 76 U/L (ref 30–99)
ALT SERPL-CCNC: 58 U/L (ref 2–50)
ANION GAP SERPL CALC-SCNC: 9 MMOL/L (ref 7–16)
AST SERPL-CCNC: 32 U/L (ref 12–45)
BILIRUB SERPL-MCNC: 0.9 MG/DL (ref 0.1–1.5)
BUN SERPL-MCNC: 13 MG/DL (ref 8–22)
CALCIUM ALBUM COR SERPL-MCNC: 8.9 MG/DL (ref 8.5–10.5)
CALCIUM SERPL-MCNC: 9.1 MG/DL (ref 8.5–10.5)
CHLORIDE SERPL-SCNC: 107 MMOL/L (ref 96–112)
CHOLEST SERPL-MCNC: 126 MG/DL (ref 100–199)
CO2 SERPL-SCNC: 29 MMOL/L (ref 20–33)
CREAT SERPL-MCNC: 1.02 MG/DL (ref 0.5–1.4)
GFR SERPLBLD CREATININE-BSD FMLA CKD-EPI: 83 ML/MIN/1.73 M 2
GLOBULIN SER CALC-MCNC: 2.6 G/DL (ref 1.9–3.5)
GLUCOSE SERPL-MCNC: 126 MG/DL (ref 65–99)
HDLC SERPL-MCNC: 32 MG/DL
HIV 1+2 AB+HIV1 P24 AG SERPL QL IA: NORMAL
LDLC SERPL CALC-MCNC: 63 MG/DL
POTASSIUM SERPL-SCNC: 3.8 MMOL/L (ref 3.6–5.5)
PROT SERPL-MCNC: 6.8 G/DL (ref 6–8.2)
PSA SERPL DL<=0.01 NG/ML-MCNC: 1.11 NG/ML (ref 0–4)
SODIUM SERPL-SCNC: 145 MMOL/L (ref 135–145)
TRIGL SERPL-MCNC: 153 MG/DL (ref 0–149)
URATE SERPL-MCNC: 7.9 MG/DL (ref 2.5–8.3)

## 2025-01-04 LAB
SHBG SERPL-SCNC: 37 NMOL/L (ref 19–76)
TESTOST FREE MFR SERPL: 1.7 % (ref 1.6–2.9)
TESTOST FREE SERPL-MCNC: 72 PG/ML (ref 47–244)
TESTOST SERPL-MCNC: 413 NG/DL (ref 300–720)

## 2025-01-20 ENCOUNTER — APPOINTMENT (OUTPATIENT)
Dept: MEDICAL GROUP | Facility: LAB | Age: 65
End: 2025-01-20
Payer: COMMERCIAL

## 2025-01-22 ENCOUNTER — APPOINTMENT (OUTPATIENT)
Dept: URBAN - METROPOLITAN AREA CLINIC 6 | Facility: CLINIC | Age: 65
Setting detail: DERMATOLOGY
End: 2025-01-22

## 2025-01-22 DIAGNOSIS — L73.9 FOLLICULAR DISORDER, UNSPECIFIED: ICD-10-CM

## 2025-01-22 DIAGNOSIS — L82.1 OTHER SEBORRHEIC KERATOSIS: ICD-10-CM

## 2025-01-22 DIAGNOSIS — L81.4 OTHER MELANIN HYPERPIGMENTATION: ICD-10-CM

## 2025-01-22 DIAGNOSIS — L738 OTHER SPECIFIED DISEASES OF HAIR AND HAIR FOLLICLES: ICD-10-CM

## 2025-01-22 DIAGNOSIS — L663 OTHER SPECIFIED DISEASES OF HAIR AND HAIR FOLLICLES: ICD-10-CM

## 2025-01-22 PROBLEM — L02.821 FURUNCLE OF HEAD [ANY PART, EXCEPT FACE]: Status: ACTIVE | Noted: 2025-01-22

## 2025-01-22 PROCEDURE — 99212 OFFICE O/P EST SF 10 MIN: CPT

## 2025-01-22 PROCEDURE — ? COUNSELING

## 2025-01-22 ASSESSMENT — LOCATION DETAILED DESCRIPTION DERM
LOCATION DETAILED: LEFT INFERIOR PREAURICULAR CHEEK
LOCATION DETAILED: LEFT CENTRAL MALAR CHEEK
LOCATION DETAILED: LEFT CENTRAL FRONTAL SCALP
LOCATION DETAILED: RIGHT MEDIAL FRONTAL SCALP
LOCATION DETAILED: RIGHT CENTRAL PARIETAL SCALP
LOCATION DETAILED: RIGHT SUPERIOR PARIETAL SCALP
LOCATION DETAILED: LEFT CENTRAL PARIETAL SCALP
LOCATION DETAILED: RIGHT INFERIOR CENTRAL MALAR CHEEK

## 2025-01-22 ASSESSMENT — LOCATION ZONE DERM
LOCATION ZONE: SCALP
LOCATION ZONE: FACE

## 2025-01-22 ASSESSMENT — LOCATION SIMPLE DESCRIPTION DERM
LOCATION SIMPLE: RIGHT CHEEK
LOCATION SIMPLE: RIGHT SCALP
LOCATION SIMPLE: LEFT SCALP
LOCATION SIMPLE: LEFT CHEEK
LOCATION SIMPLE: SCALP

## 2025-01-23 ENCOUNTER — OFFICE VISIT (OUTPATIENT)
Dept: MEDICAL GROUP | Facility: LAB | Age: 65
End: 2025-01-23
Payer: COMMERCIAL

## 2025-01-23 VITALS
OXYGEN SATURATION: 97 % | HEIGHT: 71 IN | HEART RATE: 68 BPM | TEMPERATURE: 97.4 F | WEIGHT: 245 LBS | DIASTOLIC BLOOD PRESSURE: 76 MMHG | BODY MASS INDEX: 34.3 KG/M2 | SYSTOLIC BLOOD PRESSURE: 118 MMHG | RESPIRATION RATE: 13 BRPM

## 2025-01-23 DIAGNOSIS — R73.03 PREDIABETES: ICD-10-CM

## 2025-01-23 DIAGNOSIS — Z23 NEED FOR VACCINATION: ICD-10-CM

## 2025-01-23 DIAGNOSIS — E78.5 HYPERLIPIDEMIA, UNSPECIFIED HYPERLIPIDEMIA TYPE: ICD-10-CM

## 2025-01-23 PROCEDURE — 90471 IMMUNIZATION ADMIN: CPT

## 2025-01-23 PROCEDURE — 3074F SYST BP LT 130 MM HG: CPT | Performed by: FAMILY MEDICINE

## 2025-01-23 PROCEDURE — 90656 IIV3 VACC NO PRSV 0.5 ML IM: CPT

## 2025-01-23 PROCEDURE — 99214 OFFICE O/P EST MOD 30 MIN: CPT | Mod: 25 | Performed by: FAMILY MEDICINE

## 2025-01-23 PROCEDURE — 3078F DIAST BP <80 MM HG: CPT | Performed by: FAMILY MEDICINE

## 2025-01-23 ASSESSMENT — PATIENT HEALTH QUESTIONNAIRE - PHQ9: CLINICAL INTERPRETATION OF PHQ2 SCORE: 0

## 2025-01-23 ASSESSMENT — FIBROSIS 4 INDEX: FIB4 SCORE: 1.56

## 2025-01-23 NOTE — PROGRESS NOTES
Verbal consent was acquired by the patient to use [employer] ambient listening note generation during this visit Yes    Subjective:   Gregor Bonilla is a 64 y.o. male here today for   No chief complaint on file.    History of Present Illness  The patient is a 64-year-old male with a history of hypertension and hyperlipidemia, here today to review blood work.    He reports overall good health and has no immediate concerns. He has expressed interest in understanding his vaccination needs for the current year, apart from the influenza vaccine. He received the Tdap vaccine in 2022. He is up to date on his COVID-19 booster vaccines.    His recent laboratory results from 01/02/2025 indicate a slight elevation in ALT and an increase in triglycerides. His fasting blood glucose levels were also elevated, despite adhering to a fasting period before the test. He is not aware of any snoring or potential sleep apnea. His alcohol consumption is minimal, limited to one or two bottles of red wine per week. He maintains an active lifestyle, engaging in strength training 2 to 3 times per week and achieving a daily step count of 10,000 to 12,000 steps. He does not use exercise bikes or other similar equipment.    SOCIAL HISTORY  He reports no smoking. He reports drinking one or two bottles of red wine per week.    IMMUNIZATIONS  He received the Tdap vaccine in 2022. He is up to date on his COVID-19 booster vaccines.      Allergies   Allergen Reactions    Gluten Meal Hives    Pcn [Penicillins]      Reaction unknown         Current medicines (including changes today)  Current Outpatient Medications   Medication Sig Dispense Refill    amlodipine-valsartan (EXFORGE) 5-160 MG per tablet TAKE 1 TABLET BY MOUTH EVERY DAY 90 Tablet 0    atorvastatin (LIPITOR) 40 MG Tab TAKE 1 TABLET BY MOUTH EVERY DAY IN THE EVENING 90 Tablet 3    tamsulosin (FLOMAX) 0.4 MG capsule Take 1 Capsule by mouth 1/2 hour after breakfast. 90 Capsule 3    aspirin  "(ASA) 81 MG Chew Tab chewable tablet ASPIRIN 81 MG CHEW      Omega-3 Fatty Acids (FISH OIL PO) Take  by mouth.      Misc Natural Products (TART CHERRY ADVANCED PO) Take  by mouth.      Turmeric (CURCUMIN 95 PO) Take  by mouth.       No current facility-administered medications for this visit.     He  has a past medical history of Hyperlipidemia and Hypertension.    ROS   ROS  -See HPI     Objective:     Physical Exam:  /76   Pulse 68   Temp 36.3 °C (97.4 °F) (Temporal)   Resp 13   Ht 1.803 m (5' 10.98\")   Wt 111 kg (245 lb)   SpO2 97%  Body mass index is 34.19 kg/m².   Constitutional: Alert, no distress, well-groomed.  Skin: No rashes in visible areas.  Eye: Round. Conjunctiva clear, lids normal. No icterus.   ENMT: Lips pink without lesions, good dentition, moist mucous membranes. Phonation normal.  Neck: No masses, no thyromegaly. Moves freely without pain.  Respiratory: Unlabored respiratory effort, no cough or audible wheeze  Psych: Alert and oriented x3, normal affect and mood.     Results   Latest Reference Range & Units 01/02/25 06:59   Sodium 135 - 145 mmol/L 145   Potassium 3.6 - 5.5 mmol/L 3.8   Chloride 96 - 112 mmol/L 107   Co2 20 - 33 mmol/L 29   Anion Gap 7.0 - 16.0  9.0   Glucose 65 - 99 mg/dL 126 (H)   Bun 8 - 22 mg/dL 13   Creatinine 0.50 - 1.40 mg/dL 1.02   GFR (CKD-EPI) >60 mL/min/1.73 m 2 83   Calcium 8.5 - 10.5 mg/dL 9.1   Correct Calcium 8.5 - 10.5 mg/dL 8.9   AST(SGOT) 12 - 45 U/L 32   ALT(SGPT) 2 - 50 U/L 58 (H)   Alkaline Phosphatase 30 - 99 U/L 76   Total Bilirubin 0.1 - 1.5 mg/dL 0.9   Albumin 3.2 - 4.9 g/dL 4.2   Total Protein 6.0 - 8.2 g/dL 6.8   Globulin 1.9 - 3.5 g/dL 2.6   A-G Ratio g/dL 1.6   Uric Acid 2.5 - 8.3 mg/dL 7.9   Cholesterol,Tot 100 - 199 mg/dL 126   Triglycerides 0 - 149 mg/dL 153 (H)   HDL >=40 mg/dL 32 !   LDL <100 mg/dL 63   (H): Data is abnormally high  !: Data is abnormal    Assessment and Plan:     Assessment & Plan  1. Prediabetes.  New diagnosis, " uncertain prognosis.  His fasting blood glucose levels have been consistently elevated over the past few years, indicating a trend towards diabetes. However, he does not meet the criteria for a diabetes diagnosis at this time.  We did have discussion regarding starting treatment with metformin.  Discussed risk, that is, alternatives.  Patient declines medication at this time.  His ALT levels are also elevated, which could be attributed to a high carbohydrate and sugar diet leading to hepatic inflammation. His HDL levels are slightly low, while his triglyceride levels are marginally high. He has been advised to adopt a low carbohydrate, low sugar, and high protein diet, coupled with regular exercise. A weight loss goal of 20 percent of his current weight has been set, with an emphasis on strength training. He has been encouraged to maintain a daily protein intake of 90 g, which will aid in blood sugar stabilization, satiety, and reduction of sugar and carbohydrate cravings. He has been advised to limit his alcohol consumption during this period. He has been recommended to continue his strength training regimen and incorporate 100 to 120 minutes of moderate cardiovascular exercise per week. A hemoglobin A1c test will be conducted in 3 months to monitor his average blood glucose levels over the preceding 3 months.    2. Health Maintenance.  He has been advised to receive the annual influenza vaccine and COVID-19 boosters as they become available. At age 66, he will need to get pneumonia vaccines, and at age 71, the RSV vaccine. He will receive his influenza vaccine today.    Orders:  1. Prediabetes    2. Hyperlipidemia, unspecified hyperlipidemia type    3. Need for vaccination  - INFLUENZA VACCINE TRI INJ (PF)        Followup: No follow-ups on file.         PLEASE NOTE: This dictation was created using voice recognition and [employer] ambient listening software. I have made every reasonable attempt to correct obvious  errors, but I expect that there are errors of grammar and possibly content that I did not discover before finalizing the note.

## 2025-04-02 RX ORDER — AMLODIPINE AND VALSARTAN 5; 160 MG/1; MG/1
1 TABLET ORAL DAILY
Qty: 90 TABLET | Refills: 3 | Status: SHIPPED | OUTPATIENT
Start: 2025-04-02

## 2025-04-02 NOTE — TELEPHONE ENCOUNTER
Received request via: Pharmacy    Was the patient seen in the last year in this department? Yes    Does the patient have an active prescription (recently filled or refills available) for medication(s) requested? No    Pharmacy Name: [facility]     Does the patient have [facility] and need 100-day supply? (This applies to ALL medications) Patient does not have SCP

## 2025-05-03 DIAGNOSIS — E78.5 DYSLIPIDEMIA: ICD-10-CM

## 2025-05-06 RX ORDER — ATORVASTATIN CALCIUM 40 MG/1
40 TABLET, FILM COATED ORAL EVERY EVENING
Qty: 90 TABLET | Refills: 3 | Status: SHIPPED | OUTPATIENT
Start: 2025-05-06

## 2025-06-05 ENCOUNTER — APPOINTMENT (OUTPATIENT)
Dept: URBAN - METROPOLITAN AREA CLINIC 6 | Facility: CLINIC | Age: 65
Setting detail: DERMATOLOGY
End: 2025-06-05

## 2025-06-05 DIAGNOSIS — L90.5 SCAR CONDITIONS AND FIBROSIS OF SKIN: ICD-10-CM

## 2025-06-05 DIAGNOSIS — L08.9 LOCAL INFECTION OF THE SKIN AND SUBCUTANEOUS TISSUE, UNSPECIFIED: ICD-10-CM

## 2025-06-05 PROCEDURE — ? COUNSELING

## 2025-06-05 PROCEDURE — ? PRESCRIPTION

## 2025-06-05 RX ORDER — MUPIROCIN 20 MG/G
1 OINTMENT TOPICAL BID
Qty: 22 | Refills: 1 | Status: ERX | COMMUNITY
Start: 2025-06-05

## 2025-06-05 RX ADMIN — MUPIROCIN 1: 20 OINTMENT TOPICAL at 00:00

## 2025-06-05 ASSESSMENT — LOCATION SIMPLE DESCRIPTION DERM: LOCATION SIMPLE: ABDOMEN

## 2025-06-05 ASSESSMENT — LOCATION DETAILED DESCRIPTION DERM: LOCATION DETAILED: PERIUMBILICAL SKIN

## 2025-06-05 ASSESSMENT — LOCATION ZONE DERM: LOCATION ZONE: TRUNK

## 2025-06-26 ENCOUNTER — APPOINTMENT (OUTPATIENT)
Dept: URBAN - METROPOLITAN AREA CLINIC 6 | Facility: CLINIC | Age: 65
Setting detail: DERMATOLOGY
End: 2025-06-26

## 2025-06-26 DIAGNOSIS — Z71.89 OTHER SPECIFIED COUNSELING: ICD-10-CM

## 2025-06-26 DIAGNOSIS — L81.4 OTHER MELANIN HYPERPIGMENTATION: ICD-10-CM

## 2025-06-26 DIAGNOSIS — D22 MELANOCYTIC NEVI: ICD-10-CM

## 2025-06-26 DIAGNOSIS — L82.1 OTHER SEBORRHEIC KERATOSIS: ICD-10-CM

## 2025-06-26 DIAGNOSIS — L90.5 SCAR CONDITIONS AND FIBROSIS OF SKIN: ICD-10-CM

## 2025-06-26 DIAGNOSIS — D18.0 HEMANGIOMA: ICD-10-CM

## 2025-06-26 PROBLEM — D22.61 MELANOCYTIC NEVI OF RIGHT UPPER LIMB, INCLUDING SHOULDER: Status: ACTIVE | Noted: 2025-06-26

## 2025-06-26 PROBLEM — D22.5 MELANOCYTIC NEVI OF TRUNK: Status: ACTIVE | Noted: 2025-06-26

## 2025-06-26 PROBLEM — D22.72 MELANOCYTIC NEVI OF LEFT LOWER LIMB, INCLUDING HIP: Status: ACTIVE | Noted: 2025-06-26

## 2025-06-26 PROBLEM — D18.01 HEMANGIOMA OF SKIN AND SUBCUTANEOUS TISSUE: Status: ACTIVE | Noted: 2025-06-26

## 2025-06-26 PROBLEM — D22.71 MELANOCYTIC NEVI OF RIGHT LOWER LIMB, INCLUDING HIP: Status: ACTIVE | Noted: 2025-06-26

## 2025-06-26 PROBLEM — D22.62 MELANOCYTIC NEVI OF LEFT UPPER LIMB, INCLUDING SHOULDER: Status: ACTIVE | Noted: 2025-06-26

## 2025-06-26 PROCEDURE — ? COUNSELING

## 2025-06-26 PROCEDURE — ? ADDITIONAL NOTES

## 2025-06-26 ASSESSMENT — LOCATION DETAILED DESCRIPTION DERM
LOCATION DETAILED: RIGHT DISTAL POSTERIOR THIGH
LOCATION DETAILED: RIGHT ANTERIOR PROXIMAL THIGH
LOCATION DETAILED: RIGHT MEDIAL INFERIOR CHEST
LOCATION DETAILED: LEFT INFERIOR UPPER BACK
LOCATION DETAILED: LEFT PROXIMAL CALF
LOCATION DETAILED: RIGHT PROXIMAL CALF
LOCATION DETAILED: LEFT CENTRAL MALAR CHEEK
LOCATION DETAILED: PERIUMBILICAL SKIN
LOCATION DETAILED: RIGHT VENTRAL PROXIMAL FOREARM
LOCATION DETAILED: LEFT ANTERIOR DISTAL UPPER ARM
LOCATION DETAILED: RIGHT ANTERIOR DISTAL UPPER ARM
LOCATION DETAILED: LEFT PROXIMAL DORSAL FOREARM
LOCATION DETAILED: RIGHT VENTRAL DISTAL FOREARM
LOCATION DETAILED: RIGHT PROXIMAL DORSAL FOREARM
LOCATION DETAILED: LEFT ANTERIOR PROXIMAL THIGH
LOCATION DETAILED: RIGHT SUPERIOR LATERAL MIDBACK
LOCATION DETAILED: RIGHT MID-UPPER BACK
LOCATION DETAILED: LEFT DISTAL POSTERIOR THIGH
LOCATION DETAILED: LEFT VENTRAL DISTAL FOREARM

## 2025-06-26 ASSESSMENT — LOCATION ZONE DERM
LOCATION ZONE: ARM
LOCATION ZONE: LEG
LOCATION ZONE: TRUNK
LOCATION ZONE: FACE

## 2025-06-26 ASSESSMENT — LOCATION SIMPLE DESCRIPTION DERM
LOCATION SIMPLE: RIGHT THIGH
LOCATION SIMPLE: RIGHT POSTERIOR THIGH
LOCATION SIMPLE: ABDOMEN
LOCATION SIMPLE: LEFT POSTERIOR THIGH
LOCATION SIMPLE: RIGHT FOREARM
LOCATION SIMPLE: CHEST
LOCATION SIMPLE: LEFT UPPER BACK
LOCATION SIMPLE: RIGHT LOWER BACK
LOCATION SIMPLE: LEFT THIGH
LOCATION SIMPLE: LEFT UPPER ARM
LOCATION SIMPLE: LEFT CALF
LOCATION SIMPLE: RIGHT UPPER ARM
LOCATION SIMPLE: RIGHT UPPER BACK
LOCATION SIMPLE: LEFT FOREARM
LOCATION SIMPLE: RIGHT CALF
LOCATION SIMPLE: LEFT CHEEK

## 2025-06-30 ENCOUNTER — OFFICE VISIT (OUTPATIENT)
Dept: MEDICAL GROUP | Facility: LAB | Age: 65
End: 2025-06-30
Payer: COMMERCIAL

## 2025-06-30 VITALS
DIASTOLIC BLOOD PRESSURE: 72 MMHG | HEIGHT: 71 IN | WEIGHT: 227 LBS | HEART RATE: 79 BPM | RESPIRATION RATE: 16 BRPM | OXYGEN SATURATION: 99 % | BODY MASS INDEX: 31.78 KG/M2 | TEMPERATURE: 97.7 F | SYSTOLIC BLOOD PRESSURE: 124 MMHG

## 2025-06-30 DIAGNOSIS — Z01.84 IMMUNITY STATUS TESTING: ICD-10-CM

## 2025-06-30 DIAGNOSIS — R74.8 ELEVATED LIVER ENZYMES: ICD-10-CM

## 2025-06-30 DIAGNOSIS — R73.9 BLOOD GLUCOSE ELEVATED: ICD-10-CM
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
## 2025-06-30 RX ORDER — DOXYCYCLINE HYCLATE 100 MG
100 TABLET ORAL 2 TIMES DAILY
Qty: 10 TABLET | Refills: 0 | Status: SHIPPED | OUTPATIENT
Start: 2025-06-30 | End: 2025-07-05

## 2025-06-30 RX ORDER — MUPIROCIN 2 %
OINTMENT (GRAM) TOPICAL
COMMUNITY
Start: 2025-06-05

## 2025-06-30 ASSESSMENT — FIBROSIS 4 INDEX: FIB4 SCORE: 1.56

## 2025-06-30 NOTE — PROGRESS NOTES
"Verbal consent was acquired by the patient to use "[employer] ambient listening note generation during this visit Yes    Subjective:   Gregor Bonilla is a 64 y.o. male here today for   Chief Complaint   Patient presents with    Dog Bite     History of Present Illness  The patient is a 64-year-old male who presents today with concerns regarding a recent dog bite.    He reports an incident where he was bitten by a leashed dog during his morning walk. The dog has been updated on rabies. He experienced pain in his leg following the bite. Within 15 minutes of the incident, he applied alcohol spray and cleaned the wound with soap and water. He also used mupirocin ointment as part of the treatment.    During his last visit, he was informed that his blood work indicated a potential risk for diabetes. He was advised to lose weight, which he initially struggled with but has since found a method that works for him. He has been following a diet plan recommended by a friend, which includes black coffee and six almonds in the morning, a large meal in the evening, and a snack midday if he feels dizzy. This regimen seems to be effective for him. He has been on this new regimen for a month and plans to continue it until he loses 30 to 40 pounds before retesting his blood work.    He has no record of receiving the measles vaccine and is unsure if he has had the disease. However, he does recall having chickenpox.      Allergies[1]      Current medicines (including changes today)  Current Medications[2]  He  has a past medical history of Hyperlipidemia and Hypertension.    ROS   ROS  -See HPI     Objective:     Physical Exam:  /72 (BP Location: Left arm, Patient Position: Sitting, BP Cuff Size: Large adult)   Pulse 79   Temp 36.5 °C (97.7 °F) (Temporal)   Resp 16   Ht 1.803 m (5' 10.98\")   Wt 103 kg (227 lb)   SpO2 99%  Body mass index is 31.67 kg/m².   Constitutional: Alert, no distress, well-groomed.  Skin: No rashes " in visible areas.  Small laceration measuring 4 cm located in the lateral aspect of of right leg approximately 1-1/2 to 2 cm distal to the joint line of right knee.  Laceration is superficial, clean, no foreign body noted.  Eye: Round. Conjunctiva clear, lids normal. No icterus.   ENMT: Lips pink without lesions, good dentition, moist mucous membranes. Phonation normal.  Neck: No masses, no thyromegaly. Moves freely without pain.  Respiratory: Unlabored respiratory effort, no cough or audible wheeze  Psych: Alert and oriented x3, normal affect and mood.    Results      Assessment and Plan:     Assessment & Plan  Dog bite  The wound is superficial and does not necessitate suturing. His Tdap vaccination is current, thus eliminating the need for an additional dose. The dog's rabies status is known and up-to-date, negating the requirement for a rabies shot.  Treatment plan: A 5-day course of doxycycline will be initiated due to the wound's proximity to the knee. He has been informed about the potential for sun sensitivity during this period. He is advised to apply triple antibiotic ointment to the wound.    Elevated blood glucose  His blood glucose levels were previously elevated, indicating a risk for diabetes. He has been following a new diet regimen for the past month, which includes black coffee and six almonds in the morning, a big meal in the evening, and a midday snack if needed.  Diagnostic plan: Blood glucose levels will be rechecked in 2 months to monitor progress. Labs for blood glucose will be ordered to assess the effectiveness of the current regimen.    Elevated liver enzymes  His liver enzymes were previously elevated. Significant weight loss may be required to see changes in liver enzymes.  Diagnostic plan: Liver enzymes will be rechecked in 2 months to monitor progress. Labs for liver enzymes will be ordered to evaluate the impact of weight loss.    Measles immunity status  He has no record of receiving  the measles vaccine and does not recall having the disease.  Diagnostic plan: Antibody testing for measles will be conducted in 2 months. Labs for measles antibodies will be ordered to confirm immunity status.  Treatment plan: If no antibodies are detected, indicating he neither received the vaccine nor contracted measles, administration of the measles vaccine will be considered.    Follow-up: Blood glucose levels, liver enzymes, and measles antibodies will be rechecked in 2 months.    Orders:  1. Dog bite, initial encounter  - doxycycline (VIBRAMYCIN) 100 MG Tab; Take 1 Tablet by mouth 2 times a day for 5 days.  Dispense: 10 Tablet; Refill: 0    2. Blood glucose elevated  - Comp Metabolic Panel; Future  - HEMOGLOBIN A1C; Future    3. Elevated liver enzymes  - Comp Metabolic Panel; Future    4. Immunity status testing  - RUBEOLA IGG AB; Future    Other orders  - mupirocin (BACTROBAN) 2 % Ointment; APPLY THIN FILM TO AFFECTED AREA TWICE DAILY UNTIL RESOLVED        Followup: No follow-ups on file.         PLEASE NOTE: This dictation was created using voice recognition and [employer] ambient listening software. I have made every reasonable attempt to correct obvious errors, but I expect that there are errors of grammar and possibly content that I did not discover before finalizing the note.         [1]   Allergies  Allergen Reactions    Gluten Meal Hives    Pcn [Penicillins]      Reaction unknown   [2]   Current Outpatient Medications   Medication Sig Dispense Refill    mupirocin (BACTROBAN) 2 % Ointment APPLY THIN FILM TO AFFECTED AREA TWICE DAILY UNTIL RESOLVED      atorvastatin (LIPITOR) 40 MG Tab TAKE 1 TABLET BY MOUTH EVERY DAY IN THE EVENING 90 Tablet 3    amlodipine-valsartan (EXFORGE) 5-160 MG per tablet TAKE 1 TABLET BY MOUTH EVERY DAY 90 Tablet 3    aspirin (ASA) 81 MG Chew Tab chewable tablet ASPIRIN 81 MG CHEW      Omega-3 Fatty Acids (FISH OIL PO) Take  by mouth.      Misc Natural Products (TART CHERRY  ADVANCED PO) Take  by mouth.      Turmeric (CURCUMIN 95 PO) Take  by mouth.      tamsulosin (FLOMAX) 0.4 MG capsule Take 1 Capsule by mouth 1/2 hour after breakfast. 90 Capsule 3     No current facility-administered medications for this visit.